# Patient Record
Sex: FEMALE | Race: BLACK OR AFRICAN AMERICAN | NOT HISPANIC OR LATINO | Employment: UNEMPLOYED | URBAN - METROPOLITAN AREA
[De-identification: names, ages, dates, MRNs, and addresses within clinical notes are randomized per-mention and may not be internally consistent; named-entity substitution may affect disease eponyms.]

---

## 2023-07-23 ENCOUNTER — HOSPITAL ENCOUNTER (INPATIENT)
Facility: HOSPITAL | Age: 53
LOS: 1 days | Discharge: HOME | DRG: 417 | End: 2023-07-27
Attending: EMERGENCY MEDICINE | Admitting: EMERGENCY MEDICINE

## 2023-07-23 ENCOUNTER — APPOINTMENT (OUTPATIENT)
Dept: CT IMAGING | Facility: HOSPITAL | Age: 53
DRG: 417 | End: 2023-07-23

## 2023-07-23 DIAGNOSIS — R10.84 GENERALIZED ABDOMINAL PAIN: Primary | ICD-10-CM

## 2023-07-23 DIAGNOSIS — K81.0 ACUTE CHOLECYSTITIS: ICD-10-CM

## 2023-07-23 DIAGNOSIS — R11.2 NAUSEA AND VOMITING, UNSPECIFIED VOMITING TYPE: ICD-10-CM

## 2023-07-23 LAB
ALBUMIN SERPL-MCNC: 4.6 G/DL (ref 3.5–5.2)
ALBUMIN/GLOB SERPL: 1.5 G/DL
ALP SERPL-CCNC: 89 U/L (ref 39–117)
ALT SERPL W P-5'-P-CCNC: 18 U/L (ref 1–33)
AMPHET+METHAMPHET UR QL: NEGATIVE
ANION GAP SERPL CALCULATED.3IONS-SCNC: 14 MMOL/L (ref 5–15)
AST SERPL-CCNC: 20 U/L (ref 1–32)
BACTERIA UR QL AUTO: ABNORMAL /HPF
BARBITURATES UR QL SCN: NEGATIVE
BASOPHILS # BLD AUTO: 0 10*3/MM3 (ref 0–0.2)
BASOPHILS NFR BLD AUTO: 0.1 % (ref 0–1.5)
BENZODIAZ UR QL SCN: NEGATIVE
BILIRUB SERPL-MCNC: 0.2 MG/DL (ref 0–1.2)
BILIRUB UR QL STRIP: NEGATIVE
BUN SERPL-MCNC: 20 MG/DL (ref 6–20)
BUN/CREAT SERPL: 18.2 (ref 7–25)
CALCIUM SPEC-SCNC: 10.1 MG/DL (ref 8.6–10.5)
CANNABINOIDS SERPL QL: NEGATIVE
CHLORIDE SERPL-SCNC: 109 MMOL/L (ref 98–107)
CLARITY UR: CLEAR
CO2 SERPL-SCNC: 19 MMOL/L (ref 22–29)
COCAINE UR QL: POSITIVE
COLOR UR: YELLOW
CREAT SERPL-MCNC: 1.1 MG/DL (ref 0.57–1)
DEPRECATED RDW RBC AUTO: 49 FL (ref 37–54)
EGFRCR SERPLBLD CKD-EPI 2021: 59.8 ML/MIN/1.73
EOSINOPHIL # BLD AUTO: 0 10*3/MM3 (ref 0–0.4)
EOSINOPHIL NFR BLD AUTO: 0.2 % (ref 0.3–6.2)
ERYTHROCYTE [DISTWIDTH] IN BLOOD BY AUTOMATED COUNT: 14.3 % (ref 12.3–15.4)
GLOBULIN UR ELPH-MCNC: 3.1 GM/DL
GLUCOSE SERPL-MCNC: 109 MG/DL (ref 65–99)
GLUCOSE UR STRIP-MCNC: NEGATIVE MG/DL
HCT VFR BLD AUTO: 49.4 % (ref 34–46.6)
HGB BLD-MCNC: 15.9 G/DL (ref 12–15.9)
HGB UR QL STRIP.AUTO: ABNORMAL
HYALINE CASTS UR QL AUTO: ABNORMAL /LPF
KETONES UR QL STRIP: NEGATIVE
LEUKOCYTE ESTERASE UR QL STRIP.AUTO: NEGATIVE
LIPASE SERPL-CCNC: 29 U/L (ref 13–60)
LYMPHOCYTES # BLD AUTO: 1.6 10*3/MM3 (ref 0.7–3.1)
LYMPHOCYTES NFR BLD AUTO: 14.5 % (ref 19.6–45.3)
MAGNESIUM SERPL-MCNC: 2.3 MG/DL (ref 1.6–2.6)
MCH RBC QN AUTO: 31.2 PG (ref 26.6–33)
MCHC RBC AUTO-ENTMCNC: 32.1 G/DL (ref 31.5–35.7)
MCV RBC AUTO: 97.2 FL (ref 79–97)
METHADONE UR QL SCN: NEGATIVE
MONOCYTES # BLD AUTO: 0.4 10*3/MM3 (ref 0.1–0.9)
MONOCYTES NFR BLD AUTO: 3.7 % (ref 5–12)
NEUTROPHILS NFR BLD AUTO: 8.9 10*3/MM3 (ref 1.7–7)
NEUTROPHILS NFR BLD AUTO: 81.5 % (ref 42.7–76)
NITRITE UR QL STRIP: NEGATIVE
NRBC BLD AUTO-RTO: 0.1 /100 WBC (ref 0–0.2)
OPIATES UR QL: NEGATIVE
OXYCODONE UR QL SCN: NEGATIVE
PH UR STRIP.AUTO: <=5 [PH] (ref 5–8)
PLATELET # BLD AUTO: 250 10*3/MM3 (ref 140–450)
PMV BLD AUTO: 7.4 FL (ref 6–12)
POTASSIUM SERPL-SCNC: 4.3 MMOL/L (ref 3.5–5.2)
PROT SERPL-MCNC: 7.7 G/DL (ref 6–8.5)
PROT UR QL STRIP: NEGATIVE
RBC # BLD AUTO: 5.08 10*6/MM3 (ref 3.77–5.28)
RBC # UR STRIP: ABNORMAL /HPF
REF LAB TEST METHOD: ABNORMAL
SODIUM SERPL-SCNC: 142 MMOL/L (ref 136–145)
SP GR UR STRIP: 1.1 (ref 1–1.03)
SQUAMOUS #/AREA URNS HPF: ABNORMAL /HPF
UROBILINOGEN UR QL STRIP: ABNORMAL
WBC # UR STRIP: ABNORMAL /HPF
WBC NRBC COR # BLD: 10.9 10*3/MM3 (ref 3.4–10.8)

## 2023-07-23 PROCEDURE — 80307 DRUG TEST PRSMV CHEM ANLYZR: CPT

## 2023-07-23 PROCEDURE — 83690 ASSAY OF LIPASE: CPT

## 2023-07-23 PROCEDURE — 80053 COMPREHEN METABOLIC PANEL: CPT

## 2023-07-23 PROCEDURE — 85025 COMPLETE CBC W/AUTO DIFF WBC: CPT

## 2023-07-23 PROCEDURE — 25510000001 IOPAMIDOL PER 1 ML: Performed by: EMERGENCY MEDICINE

## 2023-07-23 PROCEDURE — 25010000002 KETOROLAC TROMETHAMINE PER 15 MG

## 2023-07-23 PROCEDURE — 80307 DRUG TEST PRSMV CHEM ANLYZR: CPT | Performed by: NURSE PRACTITIONER

## 2023-07-23 PROCEDURE — 25010000002 ONDANSETRON PER 1 MG

## 2023-07-23 PROCEDURE — 81001 URINALYSIS AUTO W/SCOPE: CPT

## 2023-07-23 PROCEDURE — 74177 CT ABD & PELVIS W/CONTRAST: CPT

## 2023-07-23 PROCEDURE — 83735 ASSAY OF MAGNESIUM: CPT

## 2023-07-23 PROCEDURE — 99285 EMERGENCY DEPT VISIT HI MDM: CPT

## 2023-07-23 RX ORDER — SODIUM CHLORIDE 0.9 % (FLUSH) 0.9 %
10 SYRINGE (ML) INJECTION AS NEEDED
Status: DISCONTINUED | OUTPATIENT
Start: 2023-07-23 | End: 2023-07-27 | Stop reason: HOSPADM

## 2023-07-23 RX ORDER — ONDANSETRON 2 MG/ML
4 INJECTION INTRAMUSCULAR; INTRAVENOUS ONCE
Status: COMPLETED | OUTPATIENT
Start: 2023-07-23 | End: 2023-07-23

## 2023-07-23 RX ORDER — KETOROLAC TROMETHAMINE 15 MG/ML
15 INJECTION, SOLUTION INTRAMUSCULAR; INTRAVENOUS ONCE
Status: COMPLETED | OUTPATIENT
Start: 2023-07-23 | End: 2023-07-23

## 2023-07-23 RX ORDER — SODIUM CHLORIDE 9 MG/ML
125 INJECTION, SOLUTION INTRAVENOUS CONTINUOUS
Status: DISCONTINUED | OUTPATIENT
Start: 2023-07-24 | End: 2023-07-24 | Stop reason: ALTCHOICE

## 2023-07-23 RX ADMIN — ONDANSETRON 4 MG: 2 INJECTION INTRAMUSCULAR; INTRAVENOUS at 20:40

## 2023-07-23 RX ADMIN — KETOROLAC TROMETHAMINE 15 MG: 15 INJECTION, SOLUTION INTRAMUSCULAR; INTRAVENOUS at 20:40

## 2023-07-23 RX ADMIN — SODIUM CHLORIDE 1000 ML: 9 INJECTION, SOLUTION INTRAVENOUS at 20:47

## 2023-07-23 RX ADMIN — IOPAMIDOL 100 ML: 755 INJECTION, SOLUTION INTRAVENOUS at 21:20

## 2023-07-23 NOTE — ED PROVIDER NOTES
"Subjective   History of Present Illness  Patient is a 52-year-old -American female with a history of Crohn's disease and chronic kidney disease who presents the emergency room with generalized abdominal pain, nausea and 3 bouts of vomiting that started yesterday.  Patient states that she has not had fever, shortness of breath, chest pain or dizziness but feels \"extremely thirsty\" and is asking for something to drink.  She denies hematochezia or melena but states that she \"feels like she is dying\" due to the amount of abdominal pain she is having.  Patient does not have a primary care provider or gastroenterologist because she does not have insurance and \"cannot afford it.\"  She does not take any medication on a daily basis and has no known drug allergies.  Patient states that she has a history of crack cocaine use, with her last use about a week ago.  She denies use of alcohol but states that she smokes about a pack of cigarettes over 3 days.    Review of Systems   Constitutional:  Negative for appetite change and fever.   HENT:  Negative for congestion and rhinorrhea.    Respiratory:  Negative for shortness of breath.    Cardiovascular:  Negative for chest pain.   Gastrointestinal:  Positive for abdominal pain, nausea and vomiting. Negative for blood in stool.   Genitourinary:  Negative for dysuria and urgency.   Musculoskeletal:  Negative for arthralgias and myalgias.   Neurological:  Negative for syncope and headaches.   Hematological:  Negative for adenopathy.   Psychiatric/Behavioral:  The patient is nervous/anxious.    All other systems reviewed and are negative.    Past Medical History:   Diagnosis Date    CKD (chronic kidney disease)     Crohn disease        No Known Allergies    No past surgical history on file.    No family history on file.    Social History     Socioeconomic History    Marital status: Legally            Objective   Physical Exam  Vitals and nursing note reviewed. "   Constitutional:       General: She is awake. She is not in acute distress.     Appearance: Normal appearance. She is well-developed. She is not diaphoretic.   HENT:      Head: Normocephalic and atraumatic.      Right Ear: Tympanic membrane, ear canal and external ear normal.      Left Ear: Tympanic membrane, ear canal and external ear normal.   Eyes:      Extraocular Movements: Extraocular movements intact.      Pupils: Pupils are equal, round, and reactive to light.   Cardiovascular:      Rate and Rhythm: Normal rate and regular rhythm.      Pulses: Normal pulses.      Heart sounds: Normal heart sounds. No murmur heard.  Pulmonary:      Effort: Pulmonary effort is normal.      Breath sounds: Normal breath sounds.   Abdominal:      General: Abdomen is flat. Bowel sounds are decreased.      Palpations: Abdomen is soft.      Tenderness: There is generalized abdominal tenderness.   Musculoskeletal:         General: Normal range of motion.      Cervical back: Normal range of motion and neck supple.   Skin:     General: Skin is warm and dry.      Capillary Refill: Capillary refill takes less than 2 seconds.   Neurological:      General: No focal deficit present.      Mental Status: She is alert and oriented to person, place, and time. Mental status is at baseline.      GCS: GCS eye subscore is 4. GCS verbal subscore is 5. GCS motor subscore is 6.      Cranial Nerves: Cranial nerves 2-12 are intact.      Sensory: Sensation is intact.      Motor: Motor function is intact.      Deep Tendon Reflexes: Reflexes are normal and symmetric.   Psychiatric:         Mood and Affect: Mood normal.         Behavior: Behavior normal. Behavior is cooperative.       Procedures           ED Course  ED Course as of 07/24/23 0032   Sun Jul 23, 2023   2152 CT resulted.  Awaiting urinalysis collection. [SJ]   2254 Awaiting urine sample collection.  Primary nurse notified [SJ]      ED Course User Index  [SJ] Shaniqua Staley, MYCHAL      BP  "154/78   Pulse 59   Temp 97 °F (36.1 °C)   Resp 22   Ht 160 cm (63\")   Wt 47.6 kg (105 lb)   SpO2 (!) 89%   BMI 18.60 kg/m²     .edlabs.edmeds  CT Abdomen Pelvis With Contrast    Result Date: 7/23/2023  Impression: 1. Mild gallbladder wall thickening seen near the fundus which appears similar as compared to the previous study. No evidence of stones or biliary ductal dilatation. Otherwise, no acute intra-abdominal or intrapelvic process. 2. Ancillary findings as described above. Electronically Signed: Amber Ruby  7/23/2023 9:29 PM EDT  Workstation ID: [id number]                                        Medical Decision Making  Problems Addressed:  Generalized abdominal pain: complicated acute illness or injury  Nausea and vomiting, unspecified vomiting type: complicated acute illness or injury    Amount and/or Complexity of Data Reviewed  Labs: ordered. Decision-making details documented in ED Course.  Radiology: ordered. Decision-making details documented in ED Course.  ECG/medicine tests: ordered.    Risk  Prescription drug management.    Patient is an -American female with a history of Crohn's disease and chronic kidney disease who presents to the emergency room with generalized abdominal pain, nausea and vomiting for the past 2 days.  Exam is relatively unremarkable with normal S1/S2.  No clicks or murmurs.  No JVD or leg swelling.  Lungs clear on auscultation in all fields.  Abdomen found to be soft but generally tender with hypoactive bowel sounds throughout.  She is very anxious on exam and having difficulty concentrating and answering questions.  Pupils PERRLA without cervical lymphadenopathy.  Initial differentials include Crohn's disease flare, acute cholecystitis, acute pancreatitis, constipation.  This is not a complete list.      IV was established and labs were obtained.  Patient received the above examination.  She received Zofran and Toradol but will be given other medication after urine " "drug screen has been collected.  CBC with very mild leukocytosis of 10.9 without anemia.  CMP with elevated creatinine of 1.1 concerning for dehydration.  Urinalysis negative for acute UTI.  Acute pancreatitis ruled out with normal lipase.  Cocaine detected on urine drug screen but otherwise it is negative.  My interpretation of CT reveals no free air in the abdomen, ureteral stones or cholelithiasis.  This is concurrent with radiologist interpretation, who notes no acute abnormality.  Upon reassessment, patient is more comfortable after pain medication but states that she is still hurting quite a bit.  Patient was given Dilaudid for pain after I completed an inspect, which found the patient had not filled narcotic pain medications in the past 2 years.  At this time, I believe patient is having a Crohn's flare and do not believe that she is exaggerating the pain that she is in.  Patient asked \"do not give up on me\" in regards to figuring out what is causing her pain.  She does not have insurance and has not been able to follow-up to gastroenterology on an outpatient basis but I believe needs to establish some sort of care with them for this chronic condition.  Patient was offered overnight observation and readily excepted.  Patient was placed in ED observation unit for gastroenterologist consult in the morning.  She was made n.p.o. at midnight in case they wanted to complete any scope in the morning.  Patient has remained hemodynamically stable and is in no acute distress.+    Final diagnoses:   Generalized abdominal pain   Nausea and vomiting, unspecified vomiting type       ED Disposition  ED Disposition       ED Disposition   Decision to Admit    Condition   --    Comment   --               No follow-up provider specified.       Medication List      No changes were made to your prescriptions during this visit.            Shaniqua Staley, APRN  07/24/23 0033    "

## 2023-07-24 ENCOUNTER — INPATIENT HOSPITAL (OUTPATIENT)
Dept: URBAN - METROPOLITAN AREA HOSPITAL 84 | Facility: HOSPITAL | Age: 53
End: 2023-07-24

## 2023-07-24 DIAGNOSIS — R10.11 RIGHT UPPER QUADRANT PAIN: ICD-10-CM

## 2023-07-24 DIAGNOSIS — R19.7 DIARRHEA, UNSPECIFIED: ICD-10-CM

## 2023-07-24 DIAGNOSIS — D72.829 ELEVATED WHITE BLOOD CELL COUNT, UNSPECIFIED: ICD-10-CM

## 2023-07-24 DIAGNOSIS — R93.3 ABNORMAL FINDINGS ON DIAGNOSTIC IMAGING OF OTHER PARTS OF DI: ICD-10-CM

## 2023-07-24 DIAGNOSIS — R11.2 NAUSEA WITH VOMITING, UNSPECIFIED: ICD-10-CM

## 2023-07-24 PROBLEM — R10.9 ABDOMINAL PAIN: Status: ACTIVE | Noted: 2023-07-24

## 2023-07-24 LAB
AMYLASE SERPL-CCNC: 300 U/L (ref 28–100)
ANION GAP SERPL CALCULATED.3IONS-SCNC: 10 MMOL/L (ref 5–15)
BASOPHILS # BLD AUTO: 0 10*3/MM3 (ref 0–0.2)
BASOPHILS NFR BLD AUTO: 0.1 % (ref 0–1.5)
BUN SERPL-MCNC: 15 MG/DL (ref 6–20)
BUN/CREAT SERPL: 17.9 (ref 7–25)
CALCIUM SPEC-SCNC: 9 MG/DL (ref 8.6–10.5)
CHLORIDE SERPL-SCNC: 105 MMOL/L (ref 98–107)
CHOLEST SERPL-MCNC: 152 MG/DL (ref 0–200)
CO2 SERPL-SCNC: 22 MMOL/L (ref 22–29)
CREAT SERPL-MCNC: 0.84 MG/DL (ref 0.57–1)
CRP SERPL-MCNC: <0.3 MG/DL (ref 0–0.5)
DEPRECATED RDW RBC AUTO: 47.3 FL (ref 37–54)
EGFRCR SERPLBLD CKD-EPI 2021: 82.7 ML/MIN/1.73
EOSINOPHIL # BLD AUTO: 0 10*3/MM3 (ref 0–0.4)
EOSINOPHIL NFR BLD AUTO: 0.3 % (ref 0.3–6.2)
ERYTHROCYTE [DISTWIDTH] IN BLOOD BY AUTOMATED COUNT: 13.8 % (ref 12.3–15.4)
ERYTHROCYTE [SEDIMENTATION RATE] IN BLOOD: 11 MM/HR (ref 0–30)
GLUCOSE SERPL-MCNC: 84 MG/DL (ref 65–99)
HBA1C MFR BLD: 5.5 % (ref 4.8–5.6)
HCT VFR BLD AUTO: 39.2 % (ref 34–46.6)
HDLC SERPL-MCNC: 73 MG/DL (ref 40–60)
HGB BLD-MCNC: 12.9 G/DL (ref 12–15.9)
LDLC SERPL CALC-MCNC: 67 MG/DL (ref 0–100)
LDLC/HDLC SERPL: 0.92 {RATIO}
LYMPHOCYTES # BLD AUTO: 2.6 10*3/MM3 (ref 0.7–3.1)
LYMPHOCYTES NFR BLD AUTO: 23.2 % (ref 19.6–45.3)
MCH RBC QN AUTO: 30.8 PG (ref 26.6–33)
MCHC RBC AUTO-ENTMCNC: 32.9 G/DL (ref 31.5–35.7)
MCV RBC AUTO: 93.5 FL (ref 79–97)
MONOCYTES # BLD AUTO: 1.1 10*3/MM3 (ref 0.1–0.9)
MONOCYTES NFR BLD AUTO: 9.9 % (ref 5–12)
NEUTROPHILS NFR BLD AUTO: 66.5 % (ref 42.7–76)
NEUTROPHILS NFR BLD AUTO: 7.4 10*3/MM3 (ref 1.7–7)
NRBC BLD AUTO-RTO: 0.1 /100 WBC (ref 0–0.2)
PLATELET # BLD AUTO: 277 10*3/MM3 (ref 140–450)
PMV BLD AUTO: 8.1 FL (ref 6–12)
POTASSIUM SERPL-SCNC: 4.2 MMOL/L (ref 3.5–5.2)
RBC # BLD AUTO: 4.19 10*6/MM3 (ref 3.77–5.28)
SODIUM SERPL-SCNC: 137 MMOL/L (ref 136–145)
TRIGL SERPL-MCNC: 60 MG/DL (ref 0–150)
VLDLC SERPL-MCNC: 12 MG/DL (ref 5–40)
WBC NRBC COR # BLD: 11.2 10*3/MM3 (ref 3.4–10.8)

## 2023-07-24 PROCEDURE — 25010000002 METHYLPREDNISOLONE PER 40 MG: Performed by: NURSE PRACTITIONER

## 2023-07-24 PROCEDURE — 25010000002 HYDROMORPHONE 1 MG/ML SOLUTION: Performed by: SURGERY

## 2023-07-24 PROCEDURE — 80048 BASIC METABOLIC PNL TOTAL CA: CPT

## 2023-07-24 PROCEDURE — G0378 HOSPITAL OBSERVATION PER HR: HCPCS

## 2023-07-24 PROCEDURE — 85652 RBC SED RATE AUTOMATED: CPT | Performed by: NURSE PRACTITIONER

## 2023-07-24 PROCEDURE — 85027 COMPLETE CBC AUTOMATED: CPT

## 2023-07-24 PROCEDURE — 86140 C-REACTIVE PROTEIN: CPT | Performed by: NURSE PRACTITIONER

## 2023-07-24 PROCEDURE — 83993 ASSAY FOR CALPROTECTIN FECAL: CPT | Performed by: NURSE PRACTITIONER

## 2023-07-24 PROCEDURE — 99222 1ST HOSP IP/OBS MODERATE 55: CPT | Performed by: NURSE PRACTITIONER

## 2023-07-24 PROCEDURE — 80061 LIPID PANEL: CPT | Performed by: NURSE PRACTITIONER

## 2023-07-24 PROCEDURE — 83036 HEMOGLOBIN GLYCOSYLATED A1C: CPT | Performed by: NURSE PRACTITIONER

## 2023-07-24 PROCEDURE — 82150 ASSAY OF AMYLASE: CPT | Performed by: NURSE PRACTITIONER

## 2023-07-24 PROCEDURE — 25010000002 HYDROMORPHONE 1 MG/ML SOLUTION

## 2023-07-24 PROCEDURE — 25010000002 KETOROLAC TROMETHAMINE PER 15 MG: Performed by: NURSE PRACTITIONER

## 2023-07-24 RX ORDER — SODIUM CHLORIDE 9 MG/ML
40 INJECTION, SOLUTION INTRAVENOUS AS NEEDED
Status: DISCONTINUED | OUTPATIENT
Start: 2023-07-24 | End: 2023-07-27 | Stop reason: HOSPADM

## 2023-07-24 RX ORDER — ONDANSETRON 4 MG/1
4 TABLET, FILM COATED ORAL EVERY 6 HOURS PRN
Status: DISCONTINUED | OUTPATIENT
Start: 2023-07-24 | End: 2023-07-27 | Stop reason: HOSPADM

## 2023-07-24 RX ORDER — AMOXICILLIN 250 MG
2 CAPSULE ORAL 2 TIMES DAILY
Status: DISCONTINUED | OUTPATIENT
Start: 2023-07-24 | End: 2023-07-27 | Stop reason: HOSPADM

## 2023-07-24 RX ORDER — SODIUM CHLORIDE 0.9 % (FLUSH) 0.9 %
10 SYRINGE (ML) INJECTION AS NEEDED
Status: DISCONTINUED | OUTPATIENT
Start: 2023-07-24 | End: 2023-07-27 | Stop reason: HOSPADM

## 2023-07-24 RX ORDER — KETOROLAC TROMETHAMINE 15 MG/ML
15 INJECTION, SOLUTION INTRAMUSCULAR; INTRAVENOUS EVERY 6 HOURS PRN
Status: DISPENSED | OUTPATIENT
Start: 2023-07-24 | End: 2023-07-25

## 2023-07-24 RX ORDER — HYDRALAZINE HYDROCHLORIDE 20 MG/ML
10 INJECTION INTRAMUSCULAR; INTRAVENOUS EVERY 6 HOURS PRN
Status: DISCONTINUED | OUTPATIENT
Start: 2023-07-24 | End: 2023-07-27 | Stop reason: HOSPADM

## 2023-07-24 RX ORDER — ACETAMINOPHEN 325 MG/1
650 TABLET ORAL EVERY 4 HOURS PRN
Status: DISCONTINUED | OUTPATIENT
Start: 2023-07-24 | End: 2023-07-27 | Stop reason: HOSPADM

## 2023-07-24 RX ORDER — BISACODYL 5 MG/1
5 TABLET, DELAYED RELEASE ORAL DAILY PRN
Status: DISCONTINUED | OUTPATIENT
Start: 2023-07-24 | End: 2023-07-27 | Stop reason: HOSPADM

## 2023-07-24 RX ORDER — BISACODYL 10 MG
10 SUPPOSITORY, RECTAL RECTAL DAILY PRN
Status: DISCONTINUED | OUTPATIENT
Start: 2023-07-24 | End: 2023-07-27 | Stop reason: HOSPADM

## 2023-07-24 RX ORDER — ONDANSETRON 2 MG/ML
4 INJECTION INTRAMUSCULAR; INTRAVENOUS EVERY 6 HOURS PRN
Status: DISCONTINUED | OUTPATIENT
Start: 2023-07-24 | End: 2023-07-24 | Stop reason: SDUPTHER

## 2023-07-24 RX ORDER — SODIUM CHLORIDE 0.9 % (FLUSH) 0.9 %
10 SYRINGE (ML) INJECTION EVERY 12 HOURS SCHEDULED
Status: DISCONTINUED | OUTPATIENT
Start: 2023-07-24 | End: 2023-07-27 | Stop reason: HOSPADM

## 2023-07-24 RX ORDER — SODIUM CHLORIDE 9 MG/ML
100 INJECTION, SOLUTION INTRAVENOUS CONTINUOUS
Status: DISCONTINUED | OUTPATIENT
Start: 2023-07-24 | End: 2023-07-26

## 2023-07-24 RX ORDER — METHYLPREDNISOLONE SODIUM SUCCINATE 40 MG/ML
20 INJECTION, POWDER, LYOPHILIZED, FOR SOLUTION INTRAMUSCULAR; INTRAVENOUS EVERY 8 HOURS
Status: DISCONTINUED | OUTPATIENT
Start: 2023-07-24 | End: 2023-07-25

## 2023-07-24 RX ORDER — POLYETHYLENE GLYCOL 3350 17 G/17G
17 POWDER, FOR SOLUTION ORAL DAILY PRN
Status: DISCONTINUED | OUTPATIENT
Start: 2023-07-24 | End: 2023-07-27 | Stop reason: HOSPADM

## 2023-07-24 RX ORDER — SODIUM CHLORIDE 9 MG/ML
40 INJECTION, SOLUTION INTRAVENOUS AS NEEDED
Status: DISCONTINUED | OUTPATIENT
Start: 2023-07-24 | End: 2023-07-24 | Stop reason: SDUPTHER

## 2023-07-24 RX ORDER — ONDANSETRON 2 MG/ML
4 INJECTION INTRAMUSCULAR; INTRAVENOUS EVERY 6 HOURS PRN
Status: DISCONTINUED | OUTPATIENT
Start: 2023-07-24 | End: 2023-07-27 | Stop reason: HOSPADM

## 2023-07-24 RX ADMIN — METHYLPREDNISOLONE SODIUM SUCCINATE 20 MG: 40 INJECTION, POWDER, FOR SOLUTION INTRAMUSCULAR; INTRAVENOUS at 14:15

## 2023-07-24 RX ADMIN — Medication 10 ML: at 09:06

## 2023-07-24 RX ADMIN — HYDROMORPHONE HYDROCHLORIDE 0.5 MG: 1 INJECTION, SOLUTION INTRAMUSCULAR; INTRAVENOUS; SUBCUTANEOUS at 02:51

## 2023-07-24 RX ADMIN — KETOROLAC TROMETHAMINE 15 MG: 15 INJECTION, SOLUTION INTRAMUSCULAR; INTRAVENOUS at 09:05

## 2023-07-24 RX ADMIN — HYDROMORPHONE HYDROCHLORIDE 0.5 MG: 1 INJECTION, SOLUTION INTRAMUSCULAR; INTRAVENOUS; SUBCUTANEOUS at 14:15

## 2023-07-24 RX ADMIN — SODIUM CHLORIDE 100 ML/HR: 9 INJECTION, SOLUTION INTRAVENOUS at 02:40

## 2023-07-24 RX ADMIN — SODIUM CHLORIDE 100 ML/HR: 9 INJECTION, SOLUTION INTRAVENOUS at 22:46

## 2023-07-24 RX ADMIN — KETOROLAC TROMETHAMINE 15 MG: 15 INJECTION, SOLUTION INTRAMUSCULAR; INTRAVENOUS at 19:32

## 2023-07-24 RX ADMIN — HYDROMORPHONE HYDROCHLORIDE 0.5 MG: 1 INJECTION, SOLUTION INTRAMUSCULAR; INTRAVENOUS; SUBCUTANEOUS at 22:47

## 2023-07-24 RX ADMIN — Medication 10 ML: at 02:52

## 2023-07-24 RX ADMIN — METHYLPREDNISOLONE SODIUM SUCCINATE 20 MG: 40 INJECTION, POWDER, FOR SOLUTION INTRAMUSCULAR; INTRAVENOUS at 22:47

## 2023-07-24 RX ADMIN — SENNOSIDES AND DOCUSATE SODIUM 2 TABLET: 50; 8.6 TABLET ORAL at 22:47

## 2023-07-24 RX ADMIN — HYDROMORPHONE HYDROCHLORIDE 0.5 MG: 1 INJECTION, SOLUTION INTRAMUSCULAR; INTRAVENOUS; SUBCUTANEOUS at 19:32

## 2023-07-24 NOTE — DISCHARGE INSTR - APPOINTMENTS
September 11th at 1030 AM- New PCP appointment   [place] [id number]  [phone]  **Take photo ID and insurance card.  **Please arrive 15 minutes early for paperwork.

## 2023-07-24 NOTE — SIGNIFICANT NOTE
07/24/23 1034   Living Situation   Current Living Arrangements other (see comments)  (Living with a friend)   Potentially Unsafe Housing Conditions none   Food Insecurity   Within the past 12 months, you worried that your food would run out before you got the money to buy more. Sometimes   Within the past 12 months, the food you bought just didn't last and you didn't have money to get more. Sometimes   Transportation Needs   In the past 12 months, has lack of transportation kept you from medical appointments or from getting medications? no   In the past 12 months, has lack of transportation kept you from meetings, work, or from getting things needed for daily living? No   Utilities   In the past 12 months has the electric, gas, oil, or water company threatened to shut off services in your home? No   Abuse Screen (yes response referral indicated)   Feels Unsafe at Home or Work/School no   Feels Threatened by Someone no   Does Anyone Try to Keep You From Having Contact with Others or Doing Things Outside Your Home? no   Physical Signs of Abuse Present no   Employment   Do you want help finding or keeping work or a job? I do not need or want help   Family and Community Support   If for any reason you need help with day-to-day activities such as bathing, preparing meals, shopping, managing finances, etc., do you get the help you need? I don't need any help   How often do you feel lonely or isolated from those around you? Never   Education   Preferred Language English   Do you want help with school or training? For example, starting or completing job training or getting a high school diploma, GED or equivalent No   Physical Activity   On average, how many days per week do you engage in moderate to strenuous exercise (like a brisk walk)? 0 days   On average, how many minutes do you engage in exercise at this level? 0 min   Number of minutes of exercise per week (!) 0   Alcohol Use   Q1: How often do you have a drink  containing alcohol? Never   Q2: How many drinks containing alcohol do you have on a typical day when you are drinking? None   Q3: How often do you have six or more drinks on one occasion? Never   Mental Health   Little Interest or Pleasure in Doing Things 0-->not at all   Feeling Down, Depressed or Hopeless 0-->not at all   Disabilities   Concentrating, Remembering or Making Decisions Difficulty no   Doing Errands Independently Difficulty (such as shopping) no

## 2023-07-24 NOTE — CASE MANAGEMENT/SOCIAL WORK
Social Work Assessment  [facility]     Patient Name: Marta Aguilar  MRN: [id number]  Today's Date: 7/24/2023    Admit Date: 7/23/2023     Discharge Needs Assessment       Row Name 07/24/23 1021       Living Environment    People in Home other (see comments)  Pt is staying with a friend currently    Current Living Arrangements home    Potentially Unsafe Housing Conditions none    Primary Care Provided by self    Provides Primary Care For no one    Family Caregiver if Needed none    Able to Return to Prior Arrangements yes       Resource/Environmental Concerns    Resource/Environmental Concerns financial    Financial Concerns food, unable to afford    Transportation Concerns none       Food Insecurity    Within the past 12 months, you worried that your food would run out before you got the money to buy more. Sometimes    Within the past 12 months, the food you bought just didn't last and you didn't have money to get more. Sometimes       Transition Planning    Patient/Family Anticipates Transition to other (see comments)  home with a friend    Patient/Family Anticipated Services at Transition     Transportation Anticipated family or friend will provide       Discharge Needs Assessment    Readmission Within the Last 30 Days no previous admission in last 30 days    Equipment Currently Used at Home none    Concerns to be Addressed basic needs;financial/insurance                   Discharge Plan       Row Name 07/24/23 1025       Plan    Plan DC plan: From a friend's house. WIll return to friend's house. Friend will trnsport.    Plan Comments HARVEY met with pt at bedside to follow up on no insurance and CM assessment. Pt reported she has [place] medicaid but doesnt have her card here.  Pt would like a PCP appointment scheduled.  HARVEY scheduled PCP with [facility] in [place].  Appointment added in [facility]. Pt confirmed PCP on file. Pt reported she is unable to work due to Crohn's and has no source of income  "currently but is working on gettin disability.  Pt reported due to a 'domestic dispute\" she is staying with a friend. Pt would like a referral put in for help getting housing setup, help [place], and food stamps. SW placed referrals for all of the above.  Pt would also like a phone number to medical records. SW provided phone number for ot. Pt reported she is independent with ADLs. Pt plans to return back to her friend's house and her friend or sister will pick her up at [place]. No other needs identified.                  Continued Care and Services - Admitted Since 7/23/2023    Coordination has not been started for this encounter.       Expected Discharge Date and Time       Expected Discharge Date Expected Discharge Time    Jul 24, 2023            Demographic Summary       Row Name 07/24/23 1021       General Information    Admission Type observation    Arrived From emergency department    Referral Source admission list    Reason for Consult discharge planning    Preferred Language English       Contact Information    Permission Granted to Share Info With                    Functional Status       Row Name 07/24/23 1021       Functional Status    Usual Activity Tolerance moderate    Current Activity Tolerance moderate       Physical Activity    On average, how many days per week do you engage in moderate to strenuous exercise (like a brisk walk)? 0 days    On average, how many minutes do you engage in exercise at this level? 0 min    Number of minutes of exercise per week 0       Functional Status, IADL    Medications independent    Meal Preparation independent    Housekeeping independent    Laundry independent    Shopping independent       Mental Status    General Appearance WDL WDL       Mental Status Summary    Recent Changes in Mental Status/Cognitive Functioning no changes       Employment/    Employment Status unemployed           Ellie Dailey LCSW  Social " Worker  Office: [phone]  Fax: [phone]  [email or web address]

## 2023-07-24 NOTE — PLAN OF CARE
Problem: Adult Inpatient Plan of Care  Goal: Plan of Care Review  Outcome: Ongoing, Progressing  Flowsheets (Taken 7/24/2023 1804)  Progress: improving  Plan of Care Reviewed With: patient  Outcome Evaluation: Patient had consult with GI today, patient on clear liquid diet, care continues  Goal: Patient-Specific Goal (Individualized)  Outcome: Ongoing, Progressing  Goal: Absence of Hospital-Acquired Illness or Injury  Outcome: Ongoing, Progressing  Intervention: Identify and Manage Fall Risk  Recent Flowsheet Documentation  Taken 7/24/2023 1800 by Soco Iverson RN  Safety Promotion/Fall Prevention: safety round/check completed  Taken 7/24/2023 1600 by Soco Iverson RN  Safety Promotion/Fall Prevention: safety round/check completed  Taken 7/24/2023 1400 by Soco Iverson RN  Safety Promotion/Fall Prevention: safety round/check completed  Taken 7/24/2023 1200 by Soco Iverson RN  Safety Promotion/Fall Prevention: safety round/check completed  Taken 7/24/2023 1000 by Soco Iverson RN  Safety Promotion/Fall Prevention: safety round/check completed  Taken 7/24/2023 0800 by Soco Iverson RN  Safety Promotion/Fall Prevention: safety round/check completed  Intervention: Prevent Skin Injury  Recent Flowsheet Documentation  Taken 7/24/2023 0800 by Soco Iverson RN  Body Position: position changed independently  Skin Protection: adhesive use limited  Intervention: Prevent and Manage VTE (Venous Thromboembolism) Risk  Recent Flowsheet Documentation  Taken 7/24/2023 0800 by Soco Iverson RN  Range of Motion: active ROM (range of motion) encouraged  Intervention: Prevent Infection  Recent Flowsheet Documentation  Taken 7/24/2023 0800 by Soco Iverson RN  Infection Prevention: single patient room provided  Goal: Optimal Comfort and Wellbeing  Outcome: Ongoing, Progressing  Intervention: Monitor Pain and Promote Comfort  Recent Flowsheet Documentation  Taken  7/24/2023 0905 by Soco Iverson RN  Pain Management Interventions:   see MAR   quiet environment facilitated  Taken 7/24/2023 0800 by Sooc Iverson RN  Pain Management Interventions: quiet environment facilitated  Goal: Readiness for Transition of Care  Outcome: Ongoing, Progressing     Problem: Asthma Comorbidity  Goal: Maintenance of Asthma Control  Outcome: Ongoing, Progressing  Intervention: Maintain Asthma Symptom Control  Recent Flowsheet Documentation  Taken 7/24/2023 0800 by Soco Iverson RN  Medication Review/Management: medications reviewed     Problem: Behavioral Health Comorbidity  Goal: Maintenance of Behavioral Health Symptom Control  Outcome: Ongoing, Progressing  Intervention: Maintain Behavioral Health Symptom Control  Recent Flowsheet Documentation  Taken 7/24/2023 0800 by Soco Iverson RN  Medication Review/Management: medications reviewed     Problem: COPD (Chronic Obstructive Pulmonary Disease) Comorbidity  Goal: Maintenance of COPD Symptom Control  Outcome: Ongoing, Progressing  Intervention: Maintain COPD-Symptom Control  Recent Flowsheet Documentation  Taken 7/24/2023 0800 by Soco Iverson RN  Supportive Measures:   verbalization of feelings encouraged   active listening utilized  Medication Review/Management: medications reviewed     Problem: Diabetes Comorbidity  Goal: Blood Glucose Level Within Targeted Range  Outcome: Ongoing, Progressing     Problem: Heart Failure Comorbidity  Goal: Maintenance of Heart Failure Symptom Control  Outcome: Ongoing, Progressing  Intervention: Maintain Heart Failure-Management  Recent Flowsheet Documentation  Taken 7/24/2023 0800 by Soco Iverson RN  Medication Review/Management: medications reviewed     Problem: Hypertension Comorbidity  Goal: Blood Pressure in Desired Range  Outcome: Ongoing, Progressing  Intervention: Maintain Blood Pressure Management  Recent Flowsheet Documentation  Taken 7/24/2023 0800 by  Soco Iverson RN  Medication Review/Management: medications reviewed     Problem: Obstructive Sleep Apnea Risk or Actual Comorbidity Management  Goal: Unobstructed Breathing During Sleep  Outcome: Ongoing, Progressing     Problem: Osteoarthritis Comorbidity  Goal: Maintenance of Osteoarthritis Symptom Control  Outcome: Ongoing, Progressing  Intervention: Maintain Osteoarthritis Symptom Control  Recent Flowsheet Documentation  Taken 7/24/2023 0800 by Soco Iverson RN  Medication Review/Management: medications reviewed     Problem: Pain Chronic (Persistent) (Comorbidity Management)  Goal: Acceptable Pain Control and Functional Ability  Outcome: Ongoing, Progressing  Intervention: Manage Persistent Pain  Recent Flowsheet Documentation  Taken 7/24/2023 0800 by Soco Iverson RN  Medication Review/Management: medications reviewed  Intervention: Develop Pain Management Plan  Recent Flowsheet Documentation  Taken 7/24/2023 0905 by Soco Iverson RN  Pain Management Interventions:   see MAR   quiet environment facilitated  Taken 7/24/2023 0800 by Soco Iverson RN  Pain Management Interventions: quiet environment facilitated  Intervention: Optimize Psychosocial Wellbeing  Recent Flowsheet Documentation  Taken 7/24/2023 0800 by Soco Iverson RN  Supportive Measures:   verbalization of feelings encouraged   active listening utilized     Problem: Seizure Disorder Comorbidity  Goal: Maintenance of Seizure Control  Outcome: Ongoing, Progressing   Goal Outcome Evaluation:  Plan of Care Reviewed With: patient        Progress: improving  Outcome Evaluation: Patient had consult with GI today, patient on clear liquid diet, care continues

## 2023-07-24 NOTE — PLAN OF CARE
Goal Outcome Evaluation:  Plan of Care Reviewed With: patient        Progress: no change  Outcome Evaluation: Patient is a new admission with complaints of abd pain associated with chrons's disease. Patient received a one time dose of 0.5 dilaudid per doctors orders. NS running at 100ml/hr and she is expected to have a GI consult this am. VVS, chest rise and fall are noted. Care continues.

## 2023-07-24 NOTE — CONSULTS
GI CONSULT  NOTE:    Referring Provider:  Shaniqua Staley NP    Chief complaint: Abdominal pain, nausea/vomiting    Subjective .     History of present illness: Marta Aguilar is a 52 y.o. female with history of Crohn's disease and CKD who presents with complaints of abdominal pain and nausea/vomiting.  The patient was diagnosed with Crohn's disease in 1/2013 via colonoscopy.  She has not been on maintenance medications due to not having insurance.  She denies being on any Biologics previously.  States that she began having upper abdominal pain 2 days ago.  The pain is constant and throbbing in nature.  It does radiate to her back.  She describes associated nausea/vomiting, but this has resolved over the past 24 hours.  No heartburn or dysphagia.  States that she had been having diarrhea yesterday, but no bowel movement so far today.  Denies bright red blood per rectum or melena.  States that normally she will move her bowels daily.  No recent fever or unintentional weight loss.      Endo History:  1/2013 EGD/colonoscopy (Dr. Diaz) -normal EGD, Crohn's colitis sparing the rectum    Past Medical History:  Past Medical History:   Diagnosis Date    CKD (chronic kidney disease)     Crohn disease        Past Surgical History:  History reviewed. No pertinent surgical history.    Social History:  Social History     Tobacco Use    Smoking status: Every Day     Packs/day: 0.25     Years: 15.00     Pack years: 3.75     Types: Cigarettes    Smokeless tobacco: Never   Vaping Use    Vaping Use: Never used   Substance Use Topics    Alcohol use: Never    Drug use: Yes     Types: Cocaine(coke)       Family History:  History reviewed. No pertinent family history.    Medications:  No medications prior to admission.       Scheduled Meds:senna-docusate sodium, 2 tablet, Oral, BID  sodium chloride, 10 mL, Intravenous, Q12H  sodium chloride, 10 mL, Intravenous, Q12H      Continuous Infusions:sodium chloride, 100 mL/hr, Last  "Rate: 100 mL/hr (07/24/23 0906)      PRN Meds:.  acetaminophen    senna-docusate sodium **AND** polyethylene glycol **AND** bisacodyl **AND** bisacodyl    hydrALAZINE    HYDROmorphone    ketorolac    ondansetron **OR** ondansetron    [COMPLETED] Insert Peripheral IV **AND** sodium chloride    sodium chloride    sodium chloride    sodium chloride    ALLERGIES:  Patient has no known allergies.    ROS:  The following systems were reviewed and negative;   Constitution:  No fevers, chills, no unintentional weight loss  Skin: no rash, no jaundice  Eyes:  No blurry vision, no eye pain  HENT:  No change in hearing or smell  Resp:  No dyspnea or cough  CV:  No chest pain or palpitations  :  No dysuria, hematuria  Musculoskeletal:  No leg cramps or arthralgias  Neuro:  No tremor, no numbness  Psych:  No depression or confusion    Objective     Vital Signs:   Vitals:    07/23/23 2331 07/24/23 0149 07/24/23 0646 07/24/23 1100   BP: 154/78 165/97 113/70 117/59   BP Location:  Left arm Left arm Left arm   Patient Position:  Lying Lying Lying   Pulse: 59 60 (!) 45 54   Resp:  22 16 13   Temp:  97.6 °F (36.4 °C) 97.6 °F (36.4 °C)    TempSrc:  Oral Oral    SpO2: (!) 89% 99% 96% 97%   Weight:  47.6 kg (104 lb 15 oz)     Height:  165.1 cm (65\")         Physical Exam:       General Appearance:    Awake and alert, in no acute distress   Head:    Normocephalic, without obvious abnormality, atraumatic   Throat:   No oral lesions, no thrush, oral mucosa moist   Lungs:     Respirations regular, even and unlabored   Chest Wall:    No abnormalities observed   Abdomen:     Soft, upper abdominal tenderness, no rebound or guarding, non-distended   Rectal:     Deferred   Extremities:   Moves all extremities, no edema, no cyanosis   Pulses:   Pulses palpable and equal bilaterally   Skin:   No rash, no jaundice, normal palpation   Lymph nodes:   No cervical, supraclavicular or submandibular palpable adenopathy   Neurologic:   Cranial nerves 2 - " 12 grossly intact, no asterixis       Results Review:   I reviewed the patient's labs and imaging.  CBC    Results from last 7 days   Lab Units 07/24/23  0509 07/23/23 2016   WBC 10*3/mm3 11.20* 10.90*   HEMOGLOBIN g/dL 12.9 15.9   PLATELETS 10*3/mm3 277 250     CMP   Results from last 7 days   Lab Units 07/24/23  0509 07/23/23 2016   SODIUM mmol/L 137 142   POTASSIUM mmol/L 4.2 4.3   CHLORIDE mmol/L 105 109*   CO2 mmol/L 22.0 19.0*   BUN mg/dL 15 20   CREATININE mg/dL 0.84 1.10*   GLUCOSE mg/dL 84 109*   ALBUMIN g/dL  --  4.6   BILIRUBIN mg/dL  --  0.2   ALK PHOS U/L  --  89   AST (SGOT) U/L  --  20   ALT (SGPT) U/L  --  18   MAGNESIUM mg/dL  --  2.3   AMYLASE U/L 300*  --    LIPASE U/L  --  29     Cr Clearance Estimated Creatinine Clearance: 57.5 mL/min (by C-G formula based on SCr of 0.84 mg/dL).  Coag     HbA1C   Lab Results   Component Value Date    HGBA1C 5.50 07/24/2023     Blood Glucose No results found for: POCGLU  Infection     UA    Results from last 7 days   Lab Units 07/23/23  2319   NITRITE UA  Negative   WBC UA /HPF 0-2*   BACTERIA UA /HPF None Seen   SQUAM EPITHEL UA /HPF 0-2     Radiology(recent) CT Abdomen Pelvis With Contrast    Result Date: 7/23/2023  Impression: 1. Mild gallbladder wall thickening seen near the fundus which appears similar as compared to the previous study. No evidence of stones or biliary ductal dilatation. Otherwise, no acute intra-abdominal or intrapelvic process. 2. Ancillary findings as described above. Electronically Signed: Amber Ruby  7/23/2023 9:29 PM EDT  Workstation ID: [id number]        ASSESSMENT:  -Upper abdominal pain  -Nausea/vomiting  -Diarrhea  -Abnormal CT showing mild gallbladder wall thickening  -Leukocytosis  -CKD    PLAN:  Patient is a 52-year-old female with history of Crohn's colitis diagnosed in 2013 who presented on 7/23 with complaints of abdominal pain and nausea/vomiting.  She has not been on any maintenance medications for Crohn's disease due to  not having insurance.    CT abdomen/pelvis W on admission shows mild gallbladder wall thickening near the fundus which appears similar to previous study.  No evidence of gallstones or duct dilation.  No other acute intra-abdominal abnormality.  LFTs normal.  Mild leukocytosis with WBC count of 11.2.  Hemoglobin normal at 12.9.  We will check ESR, CRP, and fecal calprotectin.  Start low-dose steroids and monitor for symptom improvement.  For no improvement, could consider HIDA scan to rule out biliary etiology.  Clear liquid diet.  Patient would likely benefit from repeat colonoscopy as inpatient versus outpatient to determine severity of Crohn's disease.  Antiemetics/analgesics as needed.  Supportive care.      I discussed the patients findings and my recommendations with the patient.  I will discuss case with Dr. Alexis and change plan accordingly.    We appreciate the referral.    Electronically signed by MYCHAL Benedict, 07/24/23, 12:38 PM EDT.

## 2023-07-24 NOTE — H&P
"[facility] H&P    Patient Name: Marta Aguilar  : 1970  MRN: [id number]  Primary Care Physician: Provider, No Known  Date of admission: 2023     Patient Care Team:  Provider, No Known as PCP - General          Subjective   History Present Illness     Chief Complaint:   Chief Complaint   Patient presents with    Abdominal Pain     Abdominal pain    Ms. Aguilar is a 52 y.o.  presents to [facility] complaining of abdominal pain      History of Present Illness    ED 23: Patient is a 52-year-old -American female with a history of Crohn's disease and chronic kidney disease who presents the emergency room with generalized abdominal pain, nausea and 3 bouts of vomiting that started yesterday. Patient states that she has not had fever, shortness of breath, chest pain or dizziness but feels \"extremely thirsty\" and is asking for something to drink. She denies hematochezia or melena but states that she \"feels like she is dying\" due to the amount of abdominal pain she is having. Patient does not have a primary care provider or gastroenterologist because she does not have insurance and \"cannot afford it.\" She does not take any medication on a daily basis and has no known drug allergies. Patient states that she has a history of crack cocaine use, with her last use about a week ago. She denies use of alcohol but states that she smokes about a pack of cigarettes over 3 days.     Observation 23: Patient is a 52-year-old female senting to the ED with abdominal pain, nausea and vomiting.  Patient states she was diagnosed with Crohn's disease in  has not been on medication due to insurance.  Patient reports vomiting yesterday without hemoptysis or melena.  Patient denies fever, chest pain, dyspnea or syncope.  Patient states pain is 8 out of 10 and radiates from her abdominal muscle wall into her back.    Review of Systems   Constitutional: Positive for decreased appetite and " malaise/fatigue.   HENT: Negative.     Eyes: Negative.    Cardiovascular: Negative.    Respiratory: Negative.     Endocrine: Negative.    Hematologic/Lymphatic: Negative.    Skin: Negative.    Musculoskeletal:  Positive for back pain.   Gastrointestinal:  Positive for abdominal pain and nausea.   Genitourinary: Negative.    Neurological: Negative.    Psychiatric/Behavioral: Negative.     Allergic/Immunologic: Negative.          Personal History     Past Medical History:   Past Medical History:   Diagnosis Date    CKD (chronic kidney disease)     Crohn disease        Surgical History:    History reviewed. No pertinent surgical history.        Family History: family history is not on file. Otherwise pertinent FHx was reviewed and unremarkable.     Social History:  reports that she has been smoking cigarettes. She has a 3.75 pack-year smoking history. She has never used smokeless tobacco. She reports current drug use. Drug: Cocaine(coke). She reports that she does not drink alcohol.      Medications:  Prior to Admission medications    Not on File       Allergies:  No Known Allergies    Objective   Objective     Vital Signs  Temp:  [97 °F (36.1 °C)-97.6 °F (36.4 °C)] 97.6 °F (36.4 °C)  Heart Rate:  [39-67] 54  Resp:  [13-22] 13  BP: (113-175)/(59-97) 117/59  SpO2:  [89 %-100 %] 97 %  on  Flow (L/min):  [2] 2;   Device (Oxygen Therapy): room air  Body mass index is 17.46 kg/m².    Physical Exam  Vitals and nursing note reviewed.   Constitutional:       Appearance: Normal appearance.   HENT:      Head: Normocephalic and atraumatic.      Right Ear: External ear normal.      Left Ear: External ear normal.      Nose: Nose normal.      Mouth/Throat:      Mouth: Mucous membranes are moist.   Eyes:      Extraocular Movements: Extraocular movements intact.      Conjunctiva/sclera: Conjunctivae normal.      Pupils: Pupils are equal, round, and reactive to light.   Cardiovascular:      Rate and Rhythm: Normal rate and regular  rhythm.      Pulses: Normal pulses.      Heart sounds: Normal heart sounds.   Pulmonary:      Effort: Pulmonary effort is normal.      Breath sounds: Normal breath sounds.   Abdominal:      Palpations: Abdomen is soft.      Tenderness: There is abdominal tenderness.   Musculoskeletal:         General: Tenderness present.      Cervical back: Normal range of motion.   Skin:     General: Skin is warm.   Neurological:      Mental Status: She is alert and oriented to person, place, and time.   Psychiatric:         Mood and Affect: Mood normal.         Behavior: Behavior normal.         Thought Content: Thought content normal.         Judgment: Judgment normal.         Results Review:  I have personally reviewed most recent cardiac tracings, lab results, microbiology results, and radiology images and interpretations and agree with findings, most notably: CBC, CMP, CT abdomen pelvis, lipase, amylase.    Results from last 7 days   Lab Units 07/24/23  0509   WBC 10*3/mm3 11.20*   HEMOGLOBIN g/dL 12.9   HEMATOCRIT % 39.2   PLATELETS 10*3/mm3 277     Results from last 7 days   Lab Units 07/24/23  0509 07/23/23 2016   SODIUM mmol/L 137 142   POTASSIUM mmol/L 4.2 4.3   CHLORIDE mmol/L 105 109*   CO2 mmol/L 22.0 19.0*   BUN mg/dL 15 20   CREATININE mg/dL 0.84 1.10*   GLUCOSE mg/dL 84 109*   CALCIUM mg/dL 9.0 10.1   ALT (SGPT) U/L  --  18   AST (SGOT) U/L  --  20     Estimated Creatinine Clearance: 57.5 mL/min (by C-G formula based on SCr of 0.84 mg/dL).  Brief Urine Lab Results  (Last result in the past 365 days)        Color   Clarity   Blood   Leuk Est   Nitrite   Protein   CREAT   Urine HCG        07/23/23 2319 Yellow   Clear   Trace   Negative   Negative   Negative                   Microbiology Results (last 10 days)       ** No results found for the last 240 hours. **            ECG/EMG Results (most recent)       Procedure Component Value Units Date/Time    SCANNED - TELEMETRY   [[id number]] Resulted: 07/23/23     Updated:  07/24/23 0934    SCANNED - TELEMETRY   [[id number]] Resulted: 07/23/23     Updated: 07/24/23 0946                    CT Abdomen Pelvis With Contrast    Result Date: 7/23/2023  Impression: 1. Mild gallbladder wall thickening seen near the fundus which appears similar as compared to the previous study. No evidence of stones or biliary ductal dilatation. Otherwise, no acute intra-abdominal or intrapelvic process. 2. Ancillary findings as described above. Electronically Signed: Amber Tung  7/23/2023 9:29 PM EDT  Workstation ID: [id number]       Estimated Creatinine Clearance: 57.5 mL/min (by C-G formula based on SCr of 0.84 mg/dL).    Assessment & Plan   Assessment/Plan       Active Hospital Problems    Diagnosis  POA    **Abdominal pain [R10.9]  Yes      Resolved Hospital Problems   No resolved problems to display.     Abdominal pain  Lab Results   Component Value Date    WBC 11.20 (H) 07/24/2023    AST 20 07/23/2023    ALT 18 07/23/2023    ALKPHOS 89 07/23/2023    BILITOT 0.2 07/23/2023    LIPASE 29 07/23/2023     -CT of abdomen and pelvis: Mild gallbladder wall thickening which appears similar to previous study with no evidence of stones or biliary ductal dilation  -Amylase 300, lipase 29  -Clear liquid diet  -Continue IV fluids  -IV/oral antiemetics and analgesics as needed  -Steroids initially  -GI consult      Substance abuse  -UDS positive for cocaine  -        VTE Prophylaxis -   Mechanical Order History:        Ordered        07/24/23 0639  Place Sequential Compression Device  Once            07/24/23 0639  Maintain Sequential Compression Device  Continuous                          Pharmalogical Order History:       None            CODE STATUS:    Code Status and Medical Interventions:   Ordered at: 07/24/23 0639     Level Of Support Discussed With:    Patient     Code Status (Patient has no pulse and is not breathing):    CPR (Attempt to Resuscitate)     Medical Interventions (Patient has pulse or is breathing):     Full Support     Release to patient:    Routine Release       This patient has been examined wearing personal protective equipment.     I discussed the patient's findings and my recommendations with patient, family, nursing staff, primary care team, and consulting provider.      Signature:Electronically signed by MYCHAL Parisi, 07/24/23, 1:23 PM EDT.          I spent 35 minutes caring for Marta on this date of service. This time includes time spent by me in the following activities: reviewing tests, obtaining and/or reviewing a separately obtained history, performing a medically appropriate examination and/or evaluation, counseling and educating the patient/family/caregiver, ordering medications, tests, or procedures, referring and communicating with other health care professionals, documenting information in the medical record, independently interpreting results and communicating that information with the patient/family/caregiver, and care coordination.

## 2023-07-25 ENCOUNTER — APPOINTMENT (OUTPATIENT)
Dept: NUCLEAR MEDICINE | Facility: HOSPITAL | Age: 53
DRG: 417 | End: 2023-07-25

## 2023-07-25 ENCOUNTER — INPATIENT HOSPITAL (OUTPATIENT)
Dept: URBAN - METROPOLITAN AREA HOSPITAL 84 | Facility: HOSPITAL | Age: 53
End: 2023-07-25

## 2023-07-25 DIAGNOSIS — F14.10 COCAINE ABUSE, UNCOMPLICATED: ICD-10-CM

## 2023-07-25 DIAGNOSIS — R93.3 ABNORMAL FINDINGS ON DIAGNOSTIC IMAGING OF OTHER PARTS OF DI: ICD-10-CM

## 2023-07-25 DIAGNOSIS — R10.11 RIGHT UPPER QUADRANT PAIN: ICD-10-CM

## 2023-07-25 DIAGNOSIS — R74.8 ABNORMAL LEVELS OF OTHER SERUM ENZYMES: ICD-10-CM

## 2023-07-25 DIAGNOSIS — R19.7 DIARRHEA, UNSPECIFIED: ICD-10-CM

## 2023-07-25 DIAGNOSIS — D72.829 ELEVATED WHITE BLOOD CELL COUNT, UNSPECIFIED: ICD-10-CM

## 2023-07-25 DIAGNOSIS — R11.2 NAUSEA WITH VOMITING, UNSPECIFIED: ICD-10-CM

## 2023-07-25 PROBLEM — K81.0 ACUTE CHOLECYSTITIS: Status: ACTIVE | Noted: 2023-07-23

## 2023-07-25 LAB
ALBUMIN SERPL-MCNC: 3.6 G/DL (ref 3.5–5.2)
ALBUMIN/GLOB SERPL: 1.5 G/DL
ALP SERPL-CCNC: 71 U/L (ref 39–117)
ALT SERPL W P-5'-P-CCNC: 33 U/L (ref 1–33)
ANION GAP SERPL CALCULATED.3IONS-SCNC: 9 MMOL/L (ref 5–15)
AST SERPL-CCNC: 23 U/L (ref 1–32)
BASOPHILS # BLD AUTO: 0 10*3/MM3 (ref 0–0.2)
BASOPHILS NFR BLD AUTO: 0.4 % (ref 0–1.5)
BILIRUB SERPL-MCNC: <0.2 MG/DL (ref 0–1.2)
BUN SERPL-MCNC: 14 MG/DL (ref 6–20)
BUN/CREAT SERPL: 17.9 (ref 7–25)
CALCIUM SPEC-SCNC: 8.8 MG/DL (ref 8.6–10.5)
CALPROTECTIN STL-MCNT: 38 UG/G (ref 0–120)
CHLORIDE SERPL-SCNC: 107 MMOL/L (ref 98–107)
CO2 SERPL-SCNC: 21 MMOL/L (ref 22–29)
CREAT SERPL-MCNC: 0.78 MG/DL (ref 0.57–1)
DEPRECATED RDW RBC AUTO: 45.5 FL (ref 37–54)
EGFRCR SERPLBLD CKD-EPI 2021: 90.4 ML/MIN/1.73
EOSINOPHIL # BLD AUTO: 0 10*3/MM3 (ref 0–0.4)
EOSINOPHIL NFR BLD AUTO: 0 % (ref 0.3–6.2)
ERYTHROCYTE [DISTWIDTH] IN BLOOD BY AUTOMATED COUNT: 13.9 % (ref 12.3–15.4)
ETHANOL UR-MCNC: NEGATIVE %
GLOBULIN UR ELPH-MCNC: 2.4 GM/DL
GLUCOSE SERPL-MCNC: 115 MG/DL (ref 65–99)
HCT VFR BLD AUTO: 42.6 % (ref 34–46.6)
HGB BLD-MCNC: 14.1 G/DL (ref 12–15.9)
LYMPHOCYTES # BLD AUTO: 1.2 10*3/MM3 (ref 0.7–3.1)
LYMPHOCYTES NFR BLD AUTO: 13.9 % (ref 19.6–45.3)
MCH RBC QN AUTO: 31.4 PG (ref 26.6–33)
MCHC RBC AUTO-ENTMCNC: 33 G/DL (ref 31.5–35.7)
MCV RBC AUTO: 95.2 FL (ref 79–97)
MONOCYTES # BLD AUTO: 0.1 10*3/MM3 (ref 0.1–0.9)
MONOCYTES NFR BLD AUTO: 1.2 % (ref 5–12)
NEUTROPHILS NFR BLD AUTO: 7.2 10*3/MM3 (ref 1.7–7)
NEUTROPHILS NFR BLD AUTO: 84.5 % (ref 42.7–76)
NRBC BLD AUTO-RTO: 0 /100 WBC (ref 0–0.2)
PLATELET # BLD AUTO: 265 10*3/MM3 (ref 140–450)
PMV BLD AUTO: 8.4 FL (ref 6–12)
POTASSIUM SERPL-SCNC: 4.5 MMOL/L (ref 3.5–5.2)
PROT SERPL-MCNC: 6 G/DL (ref 6–8.5)
RBC # BLD AUTO: 4.47 10*6/MM3 (ref 3.77–5.28)
SODIUM SERPL-SCNC: 137 MMOL/L (ref 136–145)
WBC NRBC COR # BLD: 8.6 10*3/MM3 (ref 3.4–10.8)

## 2023-07-25 PROCEDURE — 80053 COMPREHEN METABOLIC PANEL: CPT | Performed by: NURSE PRACTITIONER

## 2023-07-25 PROCEDURE — 99204 OFFICE O/P NEW MOD 45 MIN: CPT | Performed by: SURGERY

## 2023-07-25 PROCEDURE — 25010000002 HYDROMORPHONE 1 MG/ML SOLUTION

## 2023-07-25 PROCEDURE — G0378 HOSPITAL OBSERVATION PER HR: HCPCS

## 2023-07-25 PROCEDURE — 0 TECHNETIUM TC 99M MEBROFENIN KIT: Performed by: EMERGENCY MEDICINE

## 2023-07-25 PROCEDURE — A9537 TC99M MEBROFENIN: HCPCS | Performed by: EMERGENCY MEDICINE

## 2023-07-25 PROCEDURE — 25010000002 HYDROMORPHONE 1 MG/ML SOLUTION: Performed by: SURGERY

## 2023-07-25 PROCEDURE — 25010000002 METHYLPREDNISOLONE PER 40 MG: Performed by: NURSE PRACTITIONER

## 2023-07-25 PROCEDURE — 78227 HEPATOBIL SYST IMAGE W/DRUG: CPT

## 2023-07-25 PROCEDURE — 85025 COMPLETE CBC W/AUTO DIFF WBC: CPT | Performed by: NURSE PRACTITIONER

## 2023-07-25 PROCEDURE — 25010000002 CEFAZOLIN PER 500 MG: Performed by: SURGERY

## 2023-07-25 PROCEDURE — 99232 SBSQ HOSP IP/OBS MODERATE 35: CPT | Performed by: NURSE PRACTITIONER

## 2023-07-25 RX ORDER — KIT FOR THE PREPARATION OF TECHNETIUM TC 99M MEBROFENIN 45 MG/10ML
1 INJECTION, POWDER, LYOPHILIZED, FOR SOLUTION INTRAVENOUS
Status: COMPLETED | OUTPATIENT
Start: 2023-07-25 | End: 2023-07-25

## 2023-07-25 RX ORDER — HYDROCODONE BITARTRATE AND ACETAMINOPHEN 7.5; 325 MG/1; MG/1
1 TABLET ORAL EVERY 6 HOURS PRN
Status: DISCONTINUED | OUTPATIENT
Start: 2023-07-25 | End: 2023-07-27 | Stop reason: HOSPADM

## 2023-07-25 RX ADMIN — METHYLPREDNISOLONE SODIUM SUCCINATE 20 MG: 40 INJECTION, POWDER, FOR SOLUTION INTRAMUSCULAR; INTRAVENOUS at 06:34

## 2023-07-25 RX ADMIN — Medication 10 ML: at 21:52

## 2023-07-25 RX ADMIN — Medication 10 ML: at 09:24

## 2023-07-25 RX ADMIN — HYDROMORPHONE HYDROCHLORIDE 0.5 MG: 1 INJECTION, SOLUTION INTRAMUSCULAR; INTRAVENOUS; SUBCUTANEOUS at 21:52

## 2023-07-25 RX ADMIN — CEFAZOLIN 2000 MG: 2 INJECTION, POWDER, FOR SOLUTION INTRAMUSCULAR; INTRAVENOUS at 15:33

## 2023-07-25 RX ADMIN — SENNOSIDES AND DOCUSATE SODIUM 2 TABLET: 50; 8.6 TABLET ORAL at 23:47

## 2023-07-25 RX ADMIN — HYDROCODONE BITARTRATE AND ACETAMINOPHEN 1 TABLET: 7.5; 325 TABLET ORAL at 18:08

## 2023-07-25 RX ADMIN — HYDROCODONE BITARTRATE AND ACETAMINOPHEN 1 TABLET: 7.5; 325 TABLET ORAL at 09:23

## 2023-07-25 RX ADMIN — HYDROMORPHONE HYDROCHLORIDE 0.5 MG: 1 INJECTION, SOLUTION INTRAMUSCULAR; INTRAVENOUS; SUBCUTANEOUS at 13:57

## 2023-07-25 RX ADMIN — MEBROFENIN 1 DOSE: 45 INJECTION, POWDER, LYOPHILIZED, FOR SOLUTION INTRAVENOUS at 06:56

## 2023-07-25 RX ADMIN — SODIUM CHLORIDE 100 ML/HR: 9 INJECTION, SOLUTION INTRAVENOUS at 23:47

## 2023-07-25 NOTE — CONSULTS
"    [facility] Medicine Services   Consult Note    Patient Name: Marta Aguilar  : 1970  MRN: [id number]  Primary Care Physician:  Provider, No Known  Referring Physician: No ref. provider found  Date of admission: 2023  Date and Time of Care: 2023 at 1515    Inpatient Hospitalist Consult  Consult performed by: Jeannette Hernandez APRN  Consult ordered by: Oleg Horn PA-C        Subjective      Reason for Consult/ Chief Complaint: Medical management     Consult Requested By: Oleg Horn PA-C    History of Present Illness: Marta Aguilar is a 52 y.o. -American female with a history of Crohn's disease and chronic kidney disease who presents the emergency room with generalized abdominal pain, nausea and 3 bouts of vomiting that started yesterday. Patient states that she has not had fever, shortness of breath, chest pain or dizziness but feels \"extremely thirsty\" and is asking for something to drink. She denies hematochezia or melena but states that she \"feels like she is dying\" due to the amount of abdominal pain she is having. Patient does not have a primary care provider or gastroenterologist because she does not have insurance and \"cannot afford it.\" She does not take any medication on a daily basis and has no known drug allergies. Patient states that she has a history of crack cocaine use, with her last use about a week ago. She denies use of alcohol but states that she smokes about a pack of cigarettes over 3 days.     Observation 23: Patient is a 52-year-old female senting to the ED with abdominal pain, nausea and vomiting.  Patient states she was diagnosed with Crohn's disease in  has not been on medication due to insurance.  Patient reports vomiting yesterday without hemoptysis or melena.  Patient denies fever, chest pain, dyspnea or syncope.  Patient states pain is 8 out of 10 and radiates from her abdominal muscle wall into her back.   "   7/25/2023: Patient reports she continues to experience some abdominal pain primarily in the epigastric and right upper quadrant but no new or acute symptoms are reported    07/25/2023: Patient underwent HIDA scan that was suspicious for cystic duct obstruction or chronic gallbladder dysfunction. General surgery was consulted and plans for laparoscopic cholecystectomy on 7/26/2023.  Hospitalist contacted for inpatient medical management.    Patient was seen and evaluated in bed.  In no acute distress patient states that she continues to have right upper quadrant pain.  She denies any other complaints at this time.  Plan of care discussed with patient and she verbalizes understanding.  All questions and concerns addressed.       12 point ROS reviewed and negative except as mentioned above.      Personal History     Past Medical History:   Diagnosis Date    CKD (chronic kidney disease)     Crohn disease        History reviewed. No pertinent surgical history.    Family History: family history is not on file. Otherwise pertinent FHx was reviewed and not pertinent to current issue.    Social History:  reports that she has been smoking cigarettes. She has a 3.75 pack-year smoking history. She has never used smokeless tobacco. She reports current drug use. Drug: Cocaine(coke). She reports that she does not drink alcohol.    Home Medications:        Allergies:  No Known Allergies      Objective      Vitals:  Temp:  [97.6 °F (36.4 °C)-98.3 °F (36.8 °C)] 98.3 °F (36.8 °C)  Heart Rate:  [45-85] 50  Resp:  [12-16] 12  BP: (106-144)/(54-80) 120/66  Flow (L/min):  [2] 2    Physical Exam  Vitals reviewed.   Constitutional:       General: She is not in acute distress.     Appearance: Normal appearance.   HENT:      Head: Normocephalic and atraumatic.      Nose: Nose normal.      Mouth/Throat:      Mouth: Mucous membranes are moist.      Pharynx: Oropharynx is clear.   Eyes:      Extraocular Movements: Extraocular movements intact.       Conjunctiva/sclera: Conjunctivae normal.      Pupils: Pupils are equal, round, and reactive to light.   Cardiovascular:      Rate and Rhythm: Normal rate and regular rhythm.      Pulses: Normal pulses.      Heart sounds: Normal heart sounds.   Pulmonary:      Effort: Pulmonary effort is normal.      Breath sounds: Normal breath sounds.   Abdominal:      General: Abdomen is flat. Bowel sounds are normal.      Palpations: Abdomen is soft.      Tenderness: There is abdominal tenderness.   Genitourinary:     General: Normal vulva.      Rectum: Normal.   Musculoskeletal:         General: Normal range of motion.      Cervical back: Normal range of motion and neck supple.   Skin:     General: Skin is warm and dry.      Capillary Refill: Capillary refill takes less than 2 seconds.   Neurological:      General: No focal deficit present.      Mental Status: She is alert and oriented to person, place, and time.   Psychiatric:         Mood and Affect: Mood normal.         Behavior: Behavior normal.        Result Review    Result Review:  I have personally reviewed the results from the time of this admission to 7/25/2023 15:08 EDT and agree with these findings:  [x]  Laboratory  [x]  Microbiology  [x]  Radiology  []  EKG/Telemetry   []  Cardiology/Vascular   []  Pathology  [x]  Old records  []  Other:  Most notable findings include:       Assessment & Plan        Active Hospital Problems:  Active Hospital Problems    Diagnosis     **Abdominal pain     Acute cholecystitis      Plan:     Abdominal pain  - WBC 10.9 on admission, trending down 8.6 (07/25/2023)  - CT abdomen/pelvis showed, Mild gallbladder wall thickening which appears similar to previous study with no evidence of stones or biliary ductal dilation   - Amylase 300, lipase 29  - Clear liquid diet >>> NPO at midnight starting 07/26/2023  - Continue IV fluids 100 mL/hr  - IV/oral antiemetics and analgesics as needed  - Steroids initially  - GI consult, recommended  initiation of of steroids and HIDA scan which showed patent common bile duct with gallbladder activity not definitely identified during the 2-hour course of the exam suspicious for cystic duct obstruction or chronic gallbladder dysfunction  -General surgery consulted who recommend and plan laparoscopic cholecystectomy on 7/26/2023      Signature: Electronically signed by MYCHAL Muse, 07/25/23, 15:08 EDT.  [facility] Hospitalist Team

## 2023-07-25 NOTE — PROGRESS NOTES
LOS: 0 days   Patient Care Team:  Provider, No Known as PCP - General      Subjective     Interval History:     Subjective: Patient reports that she is feeling slightly better today.  Continues to have upper abdominal pain, but states that it is improved.  Reports that she has been tolerating liquids without vomiting.      ROS:   No chest pain, shortness of breath, or cough.        Medication Review:     Current Facility-Administered Medications:     acetaminophen (TYLENOL) tablet 650 mg, 650 mg, Oral, Q4H PRN, Delmy Bui APRN    sennosides-docusate (PERICOLACE) 8.6-50 MG per tablet 2 tablet, 2 tablet, Oral, BID, 2 tablet at 07/24/23 2247 **AND** polyethylene glycol (MIRALAX) packet 17 g, 17 g, Oral, Daily PRN **AND** bisacodyl (DULCOLAX) EC tablet 5 mg, 5 mg, Oral, Daily PRN **AND** bisacodyl (DULCOLAX) suppository 10 mg, 10 mg, Rectal, Daily PRN, Shaniqua Staley APRN    hydrALAZINE (APRESOLINE) injection 10 mg, 10 mg, Intravenous, Q6H PRN, Delmy Bui APRN    HYDROcodone-acetaminophen (NORCO) 7.5-325 MG per tablet 1 tablet, 1 tablet, Oral, Q6H PRN, Oleg Horn PA-C, 1 tablet at 07/25/23 0923    HYDROmorphone (DILAUDID) injection 0.5 mg, 0.5 mg, Intravenous, Q2H PRN, Shaniqua Staley APRN, 0.5 mg at 07/24/23 2247    ondansetron (ZOFRAN) tablet 4 mg, 4 mg, Oral, Q6H PRN **OR** ondansetron (ZOFRAN) injection 4 mg, 4 mg, Intravenous, Q6H PRN, Delmy Bui APRN    [COMPLETED] Insert Peripheral IV, , , Once **AND** sodium chloride 0.9 % flush 10 mL, 10 mL, Intravenous, PRN, Shaniqua Staley, APRN    sodium chloride 0.9 % flush 10 mL, 10 mL, Intravenous, Q12H, Shaniqua Staley, APRN, 10 mL at 07/25/23 0924    sodium chloride 0.9 % flush 10 mL, 10 mL, Intravenous, PRN, Shaniqua Staley, APRN    sodium chloride 0.9 % flush 10 mL, 10 mL, Intravenous, Q12H, Delmy Bui APRN, 10 mL at 07/25/23 0924    sodium chloride 0.9 % flush 10 mL, 10 mL, Intravenous, PRN, Delmy Bui, APRN    sodium  chloride 0.9 % infusion 40 mL, 40 mL, Intravenous, PRN, Shaniqua Staley, APRN    sodium chloride 0.9 % infusion, 100 mL/hr, Intravenous, Continuous, Shaniqua Staley, APRN, Last Rate: 100 mL/hr at 07/25/23 0924, 100 mL/hr at 07/25/23 0924      Objective     Vital Signs  Vitals:    07/24/23 2220 07/25/23 0240 07/25/23 0636 07/25/23 0911   BP: 144/73 117/65 135/74 119/54   BP Location: Left arm Left arm Right arm    Patient Position: Lying Lying Lying    Pulse: 51 85 (!) 47 (!) 45   Resp: 13 14 14    Temp: 97.6 °F (36.4 °C) 97.9 °F (36.6 °C) 97.7 °F (36.5 °C)    TempSrc: Oral Oral Oral    SpO2: 96% 100% 96% 100%   Weight:   50.8 kg (111 lb 15.9 oz)    Height:           Physical Exam:     General Appearance:    Awake and alert, in no acute distress   Head:    Normocephalic, without obvious abnormality   Eyes:          Conjunctivae normal, anicteric sclera   Throat:   No oral lesions, no thrush, oral mucosa moist   Neck:   No adenopathy, supple, no JVD   Lungs:     respirations regular, even and unlabored   Abdomen:     Soft, upper abdominal tenderness, no rebound or guarding, non-distended   Rectal:     Deferred   Extremities:   No edema, no cyanosis   Skin:   No bruising or rash, no jaundice        Results Review:    CBC    Results from last 7 days   Lab Units 07/25/23  0431 07/24/23  0509 07/23/23 2016   WBC 10*3/mm3 8.60 11.20* 10.90*   HEMOGLOBIN g/dL 14.1 12.9 15.9   PLATELETS 10*3/mm3 265 277 250     CMP   Results from last 7 days   Lab Units 07/25/23  0431 07/24/23  0509 07/23/23 2016   SODIUM mmol/L 137 137 142   POTASSIUM mmol/L 4.5 4.2 4.3   CHLORIDE mmol/L 107 105 109*   CO2 mmol/L 21.0* 22.0 19.0*   BUN mg/dL 14 15 20   CREATININE mg/dL 0.78 0.84 1.10*   GLUCOSE mg/dL 115* 84 109*   ALBUMIN g/dL 3.6  --  4.6   BILIRUBIN mg/dL <0.2  --  0.2   ALK PHOS U/L 71  --  89   AST (SGOT) U/L 23  --  20   ALT (SGPT) U/L 33  --  18   MAGNESIUM mg/dL  --   --  2.3   AMYLASE U/L  --  300*  --    LIPASE U/L  --   --   29     Cr Clearance Estimated Creatinine Clearance: 66.1 mL/min (by C-G formula based on SCr of 0.78 mg/dL).  Coag     HbA1C   Lab Results   Component Value Date    HGBA1C 5.50 07/24/2023     Blood Glucose No results found for: POCGLU  Infection     UA    Results from last 7 days   Lab Units 07/23/23  2319   NITRITE UA  Negative   WBC UA /HPF 0-2*   BACTERIA UA /HPF None Seen   SQUAM EPITHEL UA /HPF 0-2     Radiology(recent) CT Abdomen Pelvis With Contrast    Result Date: 7/23/2023  Impression: 1. Mild gallbladder wall thickening seen near the fundus which appears similar as compared to the previous study. No evidence of stones or biliary ductal dilatation. Otherwise, no acute intra-abdominal or intrapelvic process. 2. Ancillary findings as described above. Electronically Signed: Amber Ruby  7/23/2023 9:29 PM EDT  Workstation ID: [id number]    NM HIDA SCAN WITH PHARMACOLOGICAL INTERVENTION    Result Date: 7/25/2023  Impression: 1. Patent common bile duct. 2. Gallbladder activity is not definitely identified during the 2-hour course of the exam, suspicious for cystic duct obstruction or chronic gallbladder dysfunction. Electronically Signed: Britton Mabry  7/25/2023 9:10 AM EDT  Workstation ID: [id number]        Assessment & Plan     ASSESSMENT:  -Upper abdominal pain  -Nausea/vomiting  -Diarrhea  -Abnormal CT showing mild gallbladder wall thickening  -Leukocytosis  -CKD     PLAN:  Patient is a 52-year-old female with history of Crohn's colitis diagnosed in 2013 who presented on 7/23 with complaints of abdominal pain and nausea/vomiting.  She has not been on any maintenance medications for Crohn's disease due to not having insurance.    Patient reports that she is feeling some better today.  Reports abdominal pain is improving.  Has been tolerating clear liquids.  HIDA scan this morning shows no identified gallbladder activity which is suspicious for cystic duct obstruction or chronic gallbladder dysfunction.  We will  consult general surgery for possible laparoscopic cholecystectomy.  Will discontinue IV steroids as there has not been any evidence of Crohn's flare on imaging.  Inflammatory markers normal.  Patient would benefit from outpatient colonoscopy following discharge.  Antiemetics/analgesics as needed.  Not much else to add from a GI standpoint at this time.  GI will be available as needed.      Electronically signed by MYCHAL Benedict, 07/25/23, 10:43 AM EDT.

## 2023-07-25 NOTE — CASE MANAGEMENT/SOCIAL WORK
Continued Stay Note   [facility]     Patient Name: Marta Aguilar  MRN: [id number]  Today's Date: 7/25/2023    Admit Date: 7/23/2023    Plan: DC plan: From a friend's house. Will return to friend's house. Friend will transport   Discharge Plan       Row Name 07/25/23 0845       Plan    Plan DC plan: From a friend's house. Will return to friend's house. Friend will transport    Plan Comments Barriers: Hida scan today. IV Solu-Medrol. IVFs. At discharge Ms. Aguilar plans to return home with a friend. No anticipated discharge needs                      Phone communication or documentation only - no physical contact with patient or family.     Noni MATHUR,RN Case Manager  [facility]  Phone: Desk- [phone] cell- [phone]

## 2023-07-25 NOTE — PROGRESS NOTES
"[facility] Observation Unit Progress Note    Patient Name: Marta Aguilar  : 1970  MRN: [id number]  Primary Care Physician: Provider, No Known  Date of admission: 2023     Patient Care Team:  Provider, No Known as PCP - General          Subjective   History Present Illness     Chief Complaint:   Chief Complaint   Patient presents with    Abdominal Pain         History of Present Illness  Ms. Aguilar is a 52 y.o.  presents to [facility] complaining of abdominal pain        History of Present Illness     ED 23: Patient is a 52-year-old -American female with a history of Crohn's disease and chronic kidney disease who presents the emergency room with generalized abdominal pain, nausea and 3 bouts of vomiting that started yesterday. Patient states that she has not had fever, shortness of breath, chest pain or dizziness but feels \"extremely thirsty\" and is asking for something to drink. She denies hematochezia or melena but states that she \"feels like she is dying\" due to the amount of abdominal pain she is having. Patient does not have a primary care provider or gastroenterologist because she does not have insurance and \"cannot afford it.\" She does not take any medication on a daily basis and has no known drug allergies. Patient states that she has a history of crack cocaine use, with her last use about a week ago. She denies use of alcohol but states that she smokes about a pack of cigarettes over 3 days.      Observation 23: Patient is a 52-year-old female senting to the ED with abdominal pain, nausea and vomiting.  Patient states she was diagnosed with Crohn's disease in  has not been on medication due to insurance.  Patient reports vomiting yesterday without hemoptysis or melena.  Patient denies fever, chest pain, dyspnea or syncope.  Patient states pain is 8 out of 10 and radiates from her abdominal muscle wall into her back.     2023: Patient reports she continues to " experience some abdominal pain primarily in the epigastric and right upper quadrant but no new or acute symptoms are reported      ROS  Review of Systems   Constitutional: Positive for decreased appetite and malaise/fatigue.   HENT: Negative.     Eyes: Negative.    Cardiovascular: Negative.    Respiratory: Negative.     Endocrine: Negative.    Hematologic/Lymphatic: Negative.    Skin: Negative.    Musculoskeletal:  Positive for back pain.   Gastrointestinal:  Positive for abdominal pain and nausea.   Genitourinary: Negative.    Neurological: Negative.    Psychiatric/Behavioral: Negative.     Allergic/Immunologic: Negative.         Personal History     Past Medical History:   Past Medical History:   Diagnosis Date    CKD (chronic kidney disease)     Crohn disease        Surgical History:    History reviewed. No pertinent surgical history.        Family History: family history is not on file. Otherwise pertinent FHx was reviewed and unremarkable.     Social History:  reports that she has been smoking cigarettes. She has a 3.75 pack-year smoking history. She has never used smokeless tobacco. She reports current drug use. Drug: Cocaine(coke). She reports that she does not drink alcohol.      Medications:  Prior to Admission medications    Not on File       Allergies:  No Known Allergies    Objective   Objective     Vital Signs  Temp:  [97.6 °F (36.4 °C)-97.9 °F (36.6 °C)] 97.9 °F (36.6 °C)  Heart Rate:  [45-85] 46  Resp:  [13-16] 16  BP: (106-144)/(54-80) 106/80  SpO2:  [96 %-100 %] 98 %  on  Flow (L/min):  [2] 2;   Device (Oxygen Therapy): room air  Body mass index is 18.64 kg/m².    Physical Exam  Constitutional:       General: She is not in acute distress.     Appearance: Normal appearance. She is not ill-appearing, toxic-appearing or diaphoretic.   HENT:      Head: Normocephalic.      Right Ear: External ear normal.      Left Ear: External ear normal.      Nose: Nose normal.      Mouth/Throat:      Mouth: Mucous  membranes are moist.   Eyes:      Extraocular Movements: Extraocular movements intact.   Cardiovascular:      Rate and Rhythm: Normal rate and regular rhythm.      Pulses: Normal pulses.   Pulmonary:      Effort: Pulmonary effort is normal.      Breath sounds: Normal breath sounds.   Abdominal:      General: Bowel sounds are normal.      Palpations: Abdomen is soft.      Tenderness: There is abdominal tenderness.   Musculoskeletal:      Cervical back: Normal range of motion.      Right lower leg: No edema.      Left lower leg: No edema.   Skin:     General: Skin is warm and dry.      Capillary Refill: Capillary refill takes less than 2 seconds.   Neurological:      General: No focal deficit present.      Mental Status: She is alert.   Psychiatric:         Mood and Affect: Mood normal.         Behavior: Behavior normal.         Thought Content: Thought content normal.         Judgment: Judgment normal.         Results Review:  I have personally reviewed most recent lab results and radiology images and interpretations and agree with findings, most notably: CMP, CBC, amylase, lipase and HIDA scan result.    Results from last 7 days   Lab Units 07/25/23  0431   WBC 10*3/mm3 8.60   HEMOGLOBIN g/dL 14.1   HEMATOCRIT % 42.6   PLATELETS 10*3/mm3 265     Results from last 7 days   Lab Units 07/25/23  0431   SODIUM mmol/L 137   POTASSIUM mmol/L 4.5   CHLORIDE mmol/L 107   CO2 mmol/L 21.0*   BUN mg/dL 14   CREATININE mg/dL 0.78   GLUCOSE mg/dL 115*   CALCIUM mg/dL 8.8   ALT (SGPT) U/L 33   AST (SGOT) U/L 23     Estimated Creatinine Clearance: 66.1 mL/min (by C-G formula based on SCr of 0.78 mg/dL).  Brief Urine Lab Results  (Last result in the past 365 days)        Color   Clarity   Blood   Leuk Est   Nitrite   Protein   CREAT   Urine HCG        07/23/23 2311 Yellow   Clear   Trace   Negative   Negative   Negative                   Microbiology Results (last 10 days)       ** No results found for the last 240 hours. **             ECG/EMG Results (most recent)       Procedure Component Value Units Date/Time    SCANNED - TELEMETRY   [[id number]] Resulted: 07/23/23     Updated: 07/24/23 0934    SCANNED - TELEMETRY   [[id number]] Resulted: 07/23/23     Updated: 07/24/23 0946    SCANNED - TELEMETRY   [[id number]] Resulted: 07/23/23     Updated: 07/25/23 0552    SCANNED - TELEMETRY   [[id number]] Resulted: 07/23/23     Updated: 07/25/23 0552    SCANNED - TELEMETRY   [[id number]] Resulted: 07/23/23     Updated: 07/25/23 0640    SCANNED - TELEMETRY   [[id number]] Resulted: 07/23/23     Updated: 07/25/23 0652    SCANNED - TELEMETRY   [[id number]] Resulted: 07/23/23     Updated: 07/25/23 0702                    CT Abdomen Pelvis With Contrast    Result Date: 7/23/2023  Impression: 1. Mild gallbladder wall thickening seen near the fundus which appears similar as compared to the previous study. No evidence of stones or biliary ductal dilatation. Otherwise, no acute intra-abdominal or intrapelvic process. 2. Ancillary findings as described above. Electronically Signed: Amber Ruby  7/23/2023 9:29 PM EDT  Workstation ID: [id number]    NM HIDA SCAN WITH PHARMACOLOGICAL INTERVENTION    Result Date: 7/25/2023  Impression: 1. Patent common bile duct. 2. Gallbladder activity is not definitely identified during the 2-hour course of the exam, suspicious for cystic duct obstruction or chronic gallbladder dysfunction. Electronically Signed: Britton Mabry  7/25/2023 9:10 AM EDT  Workstation ID: [id number]       Estimated Creatinine Clearance: 66.1 mL/min (by C-G formula based on SCr of 0.78 mg/dL).    Assessment & Plan   Assessment/Plan       Active Hospital Problems    Diagnosis  POA    **Abdominal pain [R10.9]  Yes    Acute cholecystitis [K81.0]  Unknown      Resolved Hospital Problems   No resolved problems to display.     Abdominal pain            Lab Results   Component Value Date     WBC 11.20 (H) 07/24/2023     AST 20 07/23/2023     ALT 18 07/23/2023     ALKPHOS 89  07/23/2023     BILITOT 0.2 07/23/2023     LIPASE 29 07/23/2023   -WBCs trended to 8.60 on 7/25/2023-CT of abdomen and pelvis: Mild gallbladder wall thickening which appears similar to previous study with no evidence of stones or biliary ductal dilation  -Amylase 300, lipase 29  -Clear liquid diet  -Continue IV fluids  -IV/oral antiemetics and analgesics as needed  -Steroids initially  -GI consult, recommended initiation of of steroids and HIDA scan which showed patent common bile duct with gallbladder activity not definitely identified during the 2-hour course of the exam suspicious for cystic duct obstruction or chronic gallbladder dysfunction  -General surgery consulted who recommend and plan laparoscopic cholecystectomy on 7/26/2023  -Hospitalist consulted            VTE Prophylaxis -   Mechanical Order History:        Ordered        07/25/23 1318  Place Sequential Compression Device on Patient in Pre-Op  Once            07/24/23 0639  Place Sequential Compression Device  Once            07/24/23 0639  Maintain Sequential Compression Device  Continuous                          Pharmalogical Order History:       None            CODE STATUS:    Code Status and Medical Interventions:   Ordered at: 07/24/23 0639     Level Of Support Discussed With:    Patient     Code Status (Patient has no pulse and is not breathing):    CPR (Attempt to Resuscitate)     Medical Interventions (Patient has pulse or is breathing):    Full Support     Release to patient:    Routine Release       This patient has been examined wearing personal protective equipment.     I discussed the patient's findings and my recommendations with patient and nursing staff.      Signature:Electronically signed by Oleg Horn PA-C, 07/25/23, 2:38 PM EDT.

## 2023-07-25 NOTE — CONSULTS
General Surgery Consult Note      Name: Marta Aguilar ADMIT: 2023   : 1970  PCP: Provider, No Known    MRN: [id number] LOS: 0 days   AGE/SEX: 52 y.o. female  ROOM: [address] [place] [address]      Patient Care Team:  Provider, No Known as PCP - General  Chief Complaint   Patient presents with    Abdominal Pain       Subjective   52-year-old lady with a history of Crohn's disease has been in the hospital with 2 to 3 days worth of pain which begins in her central back and radiates around to her anterior abdomen.  She has been having pain like this for years.  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  She has nausea without significant emesis no significant change in her bowel function.  She says that she has been worked up for gallbladder before and was offered removal but could not get to an elective procedure.  On arrival here she was a treated for possible Crohn's flare steroids were initiated.  She still has her symptoms.  Gallbladder showed some mild gallbladder wall thickening which was stable compared to previous exams and the HIDA scan did not show any filling of the gallbladder concerning for acute or chronic cholecystitis was asked to take her gallbladder out.    Past Medical History:   Diagnosis Date    CKD (chronic kidney disease)     Crohn disease      History reviewed. No pertinent surgical history.  History reviewed. No pertinent family history.    Social History     Tobacco Use    Smoking status: Every Day     Packs/day: 0.25     Years: 15.00     Pack years: 3.75     Types: Cigarettes    Smokeless tobacco: Never   Vaping Use    Vaping Use: Never used   Substance Use Topics    Alcohol use: Never    Drug use: Yes     Types: Cocaine(coke)     No medications prior to admission.     senna-docusate sodium, 2 tablet, Oral, BID  sodium chloride, 10 mL, Intravenous, Q12H  sodium chloride, 10 mL, Intravenous, Q12H      sodium chloride, 100 mL/hr, Last Rate: 100 mL/hr  (07/25/23 0924)        acetaminophen    senna-docusate sodium **AND** polyethylene glycol **AND** bisacodyl **AND** bisacodyl    hydrALAZINE    HYDROcodone-acetaminophen    HYDROmorphone    ondansetron **OR** ondansetron    [COMPLETED] Insert Peripheral IV **AND** sodium chloride    sodium chloride    sodium chloride    sodium chloride  Patient has no known allergies.    Review of Systems   Constitutional:  Negative for chills and fever.   HENT:  Negative for sore throat and trouble swallowing.    Eyes:  Negative for blurred vision and double vision.   Respiratory:  Positive for shortness of breath. Negative for cough.    Cardiovascular:  Positive for chest pain. Negative for leg swelling.   Gastrointestinal:  Positive for abdominal pain, nausea and vomiting. Negative for abdominal distention, blood in stool, constipation and diarrhea.   Genitourinary:  Negative for dysuria and hematuria.   Skin:  Negative for rash and wound.   Neurological:  Negative for dizziness and confusion.   Psychiatric/Behavioral:  Negative for agitation and depressed mood.       Objective     Vital Signs and Labs:  Vital Signs Patient Vitals for the past 24 hrs:   BP Temp Temp src Pulse Resp SpO2 Weight   07/25/23 1045 106/80 97.9 °F (36.6 °C) Oral (!) 46 16 98 % --   07/25/23 0911 119/54 -- -- (!) 45 -- 100 % --   07/25/23 0636 135/74 97.7 °F (36.5 °C) Oral (!) 47 14 96 % 50.8 kg (111 lb 15.9 oz)   07/25/23 0240 117/65 97.9 °F (36.6 °C) Oral 85 14 100 % --   07/24/23 2220 144/73 97.6 °F (36.4 °C) Oral 51 13 96 % --       Physical Exam:  Physical Exam  Constitutional:       Appearance: Normal appearance.   HENT:      Head: Normocephalic and atraumatic.   Cardiovascular:      Rate and Rhythm: Normal rate.   Pulmonary:      Effort: Pulmonary effort is normal. No respiratory distress.   Abdominal:      General: There is no distension.      Palpations: Abdomen is soft.      Tenderness: There is no abdominal tenderness.   Skin:     General: Skin  is warm and dry.   Neurological:      General: No focal deficit present.      Mental Status: She is alert. Mental status is at baseline.   Psychiatric:         Mood and Affect: Mood normal.         Behavior: Behavior normal.       CBC    Results from last 7 days   Lab Units 07/25/23  0431 07/24/23  0509 07/23/23 2016   WBC 10*3/mm3 8.60 11.20* 10.90*   HEMOGLOBIN g/dL 14.1 12.9 15.9   PLATELETS 10*3/mm3 265 277 250     CMP   Results from last 7 days   Lab Units 07/25/23  0431 07/24/23  0509 07/23/23 2016   SODIUM mmol/L 137 137 142   POTASSIUM mmol/L 4.5 4.2 4.3   CHLORIDE mmol/L 107 105 109*   CO2 mmol/L 21.0* 22.0 19.0*   BUN mg/dL 14 15 20   CREATININE mg/dL 0.78 0.84 1.10*   GLUCOSE mg/dL 115* 84 109*   ALBUMIN g/dL 3.6  --  4.6   BILIRUBIN mg/dL <0.2  --  0.2   ALK PHOS U/L 71  --  89   AST (SGOT) U/L 23  --  20   ALT (SGPT) U/L 33  --  18   AMYLASE U/L  --  300*  --    LIPASE U/L  --   --  29     Radiology(recent) CT Abdomen Pelvis With Contrast    Result Date: 7/23/2023  Impression: 1. Mild gallbladder wall thickening seen near the fundus which appears similar as compared to the previous study. No evidence of stones or biliary ductal dilatation. Otherwise, no acute intra-abdominal or intrapelvic process. 2. Ancillary findings as described above. Electronically Signed: Amber Ruby  7/23/2023 9:29 PM EDT  Workstation ID: [id number]    NM HIDA SCAN WITH PHARMACOLOGICAL INTERVENTION    Result Date: 7/25/2023  Impression: 1. Patent common bile duct. 2. Gallbladder activity is not definitely identified during the 2-hour course of the exam, suspicious for cystic duct obstruction or chronic gallbladder dysfunction. Electronically Signed: Britton Mabry  7/25/2023 9:10 AM EDT  Workstation ID: [id number]     I reviewed the patient's new clinical results.  I reviewed the patient's new imaging results and agree with the interpretation.    Assessment & Plan       Abdominal pain      52 y.o. female admitted with back pain with  radiation around to her right upper quadrant.  Imaging concerning for acute or chronic cholecystitis    Counseled her about the anticipated diagnosis of acute or chronic cholecystitis the treatment options surgical and nonsurgical we talked about the steps of the operation anticipated cover the possible complications.  After our discussion about the risk and benefits of the surgery she does wish to move forward with cholecystectomy.      This note was created using Dragon Voice Recognition software.    Juve Hudson MD  07/25/23  13:20 EDT

## 2023-07-25 NOTE — PLAN OF CARE
Goal Outcome Evaluation:  Plan of Care Reviewed With: patient        Progress: no change  Outcome Evaluation: Pt was evaluated by MD Tristan after HIDA scan. Pt continued on IV fluids throughout the day and pain was controlled with meds. Pt is being scheduled for a lap cholecystectomy tomorrow at 1600. Pt is on a clear liquid diet until midnight tonight. Consent has been signed by patient and dayshift RN.

## 2023-07-25 NOTE — PLAN OF CARE
Goal Outcome Evaluation:  Plan of Care Reviewed With: patient        Progress: improving  Outcome Evaluation: Patient is currently NPO for a HIDA , no pain medication after midnight. VVS, chest rise and fall are noted. Care continues.

## 2023-07-26 ENCOUNTER — ANESTHESIA EVENT (OUTPATIENT)
Dept: PERIOP | Facility: HOSPITAL | Age: 53
DRG: 417 | End: 2023-07-26

## 2023-07-26 ENCOUNTER — ANESTHESIA (OUTPATIENT)
Dept: PERIOP | Facility: HOSPITAL | Age: 53
DRG: 417 | End: 2023-07-26

## 2023-07-26 LAB
ANION GAP SERPL CALCULATED.3IONS-SCNC: 9 MMOL/L (ref 5–15)
BASOPHILS # BLD AUTO: 0 10*3/MM3 (ref 0–0.2)
BASOPHILS # BLD AUTO: 0.1 10*3/MM3 (ref 0–0.2)
BASOPHILS NFR BLD AUTO: 0.2 % (ref 0–1.5)
BASOPHILS NFR BLD AUTO: 0.9 % (ref 0–1.5)
BUN SERPL-MCNC: 12 MG/DL (ref 6–20)
BUN/CREAT SERPL: 14.6 (ref 7–25)
CALCIUM SPEC-SCNC: 8.3 MG/DL (ref 8.6–10.5)
CHLORIDE SERPL-SCNC: 105 MMOL/L (ref 98–107)
CO2 SERPL-SCNC: 23 MMOL/L (ref 22–29)
CREAT SERPL-MCNC: 0.82 MG/DL (ref 0.57–1)
DEPRECATED RDW RBC AUTO: 43.8 FL (ref 37–54)
DEPRECATED RDW RBC AUTO: 44.2 FL (ref 37–54)
EGFRCR SERPLBLD CKD-EPI 2021: 85.1 ML/MIN/1.73
EOSINOPHIL # BLD AUTO: 0.1 10*3/MM3 (ref 0–0.4)
EOSINOPHIL # BLD AUTO: 0.1 10*3/MM3 (ref 0–0.4)
EOSINOPHIL NFR BLD AUTO: 0.7 % (ref 0.3–6.2)
EOSINOPHIL NFR BLD AUTO: 1 % (ref 0.3–6.2)
ERYTHROCYTE [DISTWIDTH] IN BLOOD BY AUTOMATED COUNT: 13.4 % (ref 12.3–15.4)
ERYTHROCYTE [DISTWIDTH] IN BLOOD BY AUTOMATED COUNT: 13.5 % (ref 12.3–15.4)
GLUCOSE SERPL-MCNC: 83 MG/DL (ref 65–99)
HCT VFR BLD AUTO: 41.6 % (ref 34–46.6)
HCT VFR BLD AUTO: 42.7 % (ref 34–46.6)
HGB BLD-MCNC: 13.7 G/DL (ref 12–15.9)
HGB BLD-MCNC: 14.1 G/DL (ref 12–15.9)
LYMPHOCYTES # BLD AUTO: 2.9 10*3/MM3 (ref 0.7–3.1)
LYMPHOCYTES # BLD AUTO: 3.3 10*3/MM3 (ref 0.7–3.1)
LYMPHOCYTES NFR BLD AUTO: 23.3 % (ref 19.6–45.3)
LYMPHOCYTES NFR BLD AUTO: 34.4 % (ref 19.6–45.3)
MCH RBC QN AUTO: 31 PG (ref 26.6–33)
MCH RBC QN AUTO: 31.2 PG (ref 26.6–33)
MCHC RBC AUTO-ENTMCNC: 33 G/DL (ref 31.5–35.7)
MCHC RBC AUTO-ENTMCNC: 33.1 G/DL (ref 31.5–35.7)
MCV RBC AUTO: 93.9 FL (ref 79–97)
MCV RBC AUTO: 94.8 FL (ref 79–97)
MONOCYTES # BLD AUTO: 0.6 10*3/MM3 (ref 0.1–0.9)
MONOCYTES # BLD AUTO: 0.6 10*3/MM3 (ref 0.1–0.9)
MONOCYTES NFR BLD AUTO: 5 % (ref 5–12)
MONOCYTES NFR BLD AUTO: 6.4 % (ref 5–12)
NEUTROPHILS NFR BLD AUTO: 5.4 10*3/MM3 (ref 1.7–7)
NEUTROPHILS NFR BLD AUTO: 57.3 % (ref 42.7–76)
NEUTROPHILS NFR BLD AUTO: 70.8 % (ref 42.7–76)
NEUTROPHILS NFR BLD AUTO: 8.7 10*3/MM3 (ref 1.7–7)
NRBC BLD AUTO-RTO: 0 /100 WBC (ref 0–0.2)
NRBC BLD AUTO-RTO: 0.1 /100 WBC (ref 0–0.2)
PLATELET # BLD AUTO: 241 10*3/MM3 (ref 140–450)
PLATELET # BLD AUTO: 290 10*3/MM3 (ref 140–450)
PMV BLD AUTO: 8 FL (ref 6–12)
PMV BLD AUTO: 8.2 FL (ref 6–12)
POTASSIUM SERPL-SCNC: 3.4 MMOL/L (ref 3.5–5.2)
RBC # BLD AUTO: 4.39 10*6/MM3 (ref 3.77–5.28)
RBC # BLD AUTO: 4.55 10*6/MM3 (ref 3.77–5.28)
SODIUM SERPL-SCNC: 137 MMOL/L (ref 136–145)
WBC NRBC COR # BLD: 12.3 10*3/MM3 (ref 3.4–10.8)
WBC NRBC COR # BLD: 9.5 10*3/MM3 (ref 3.4–10.8)

## 2023-07-26 PROCEDURE — 85025 COMPLETE CBC W/AUTO DIFF WBC: CPT | Performed by: SURGERY

## 2023-07-26 PROCEDURE — 25010000002 CEFAZOLIN PER 500 MG: Performed by: SURGERY

## 2023-07-26 PROCEDURE — G0378 HOSPITAL OBSERVATION PER HR: HCPCS

## 2023-07-26 PROCEDURE — 47562 LAPAROSCOPIC CHOLECYSTECTOMY: CPT | Performed by: SURGERY

## 2023-07-26 PROCEDURE — 88304 TISSUE EXAM BY PATHOLOGIST: CPT | Performed by: SURGERY

## 2023-07-26 PROCEDURE — 0FT44ZZ RESECTION OF GALLBLADDER, PERCUTANEOUS ENDOSCOPIC APPROACH: ICD-10-PCS | Performed by: SURGERY

## 2023-07-26 PROCEDURE — 25010000002 FENTANYL CITRATE (PF) 100 MCG/2ML SOLUTION: Performed by: NURSE ANESTHETIST, CERTIFIED REGISTERED

## 2023-07-26 PROCEDURE — 25010000002 BUPIVACAINE (PF) 0.25 % SOLUTION: Performed by: SURGERY

## 2023-07-26 PROCEDURE — 25010000002 HYDROMORPHONE 1 MG/ML SOLUTION: Performed by: SURGERY

## 2023-07-26 PROCEDURE — 80048 BASIC METABOLIC PNL TOTAL CA: CPT | Performed by: SURGERY

## 2023-07-26 PROCEDURE — 47562 LAPAROSCOPIC CHOLECYSTECTOMY: CPT | Performed by: NURSE PRACTITIONER

## 2023-07-26 PROCEDURE — 25010000002 SUGAMMADEX 200 MG/2ML SOLUTION: Performed by: NURSE ANESTHETIST, CERTIFIED REGISTERED

## 2023-07-26 PROCEDURE — 85025 COMPLETE CBC W/AUTO DIFF WBC: CPT | Performed by: NURSE PRACTITIONER

## 2023-07-26 PROCEDURE — 25010000002 HYDROMORPHONE 1 MG/ML SOLUTION

## 2023-07-26 PROCEDURE — 80048 BASIC METABOLIC PNL TOTAL CA: CPT | Performed by: NURSE PRACTITIONER

## 2023-07-26 PROCEDURE — 25010000002 KETOROLAC TROMETHAMINE PER 15 MG

## 2023-07-26 PROCEDURE — 25010000002 FENTANYL CITRATE (PF) 50 MCG/ML SOLUTION: Performed by: NURSE ANESTHETIST, CERTIFIED REGISTERED

## 2023-07-26 PROCEDURE — 25010000002 DEXAMETHASONE PER 1 MG: Performed by: NURSE ANESTHETIST, CERTIFIED REGISTERED

## 2023-07-26 PROCEDURE — 25010000002 HYDROMORPHONE 1 MG/ML SOLUTION: Performed by: NURSE ANESTHETIST, CERTIFIED REGISTERED

## 2023-07-26 DEVICE — LIGAMAX 5 MM ENDOSCOPIC MULTIPLE CLIP APPLIER
Type: IMPLANTABLE DEVICE | Site: ABDOMEN | Status: FUNCTIONAL
Brand: LIGAMAX

## 2023-07-26 RX ORDER — DIPHENHYDRAMINE HYDROCHLORIDE 50 MG/ML
12.5 INJECTION INTRAMUSCULAR; INTRAVENOUS
Status: DISCONTINUED | OUTPATIENT
Start: 2023-07-26 | End: 2023-07-26 | Stop reason: HOSPADM

## 2023-07-26 RX ORDER — SODIUM CHLORIDE 0.9 % (FLUSH) 0.9 %
10 SYRINGE (ML) INJECTION AS NEEDED
Status: DISCONTINUED | OUTPATIENT
Start: 2023-07-26 | End: 2023-07-26 | Stop reason: HOSPADM

## 2023-07-26 RX ORDER — DEXTROSE, SODIUM CHLORIDE, SODIUM LACTATE, POTASSIUM CHLORIDE, AND CALCIUM CHLORIDE 5; .6; .31; .03; .02 G/100ML; G/100ML; G/100ML; G/100ML; G/100ML
100 INJECTION, SOLUTION INTRAVENOUS CONTINUOUS
Status: DISCONTINUED | OUTPATIENT
Start: 2023-07-26 | End: 2023-07-27

## 2023-07-26 RX ORDER — LIDOCAINE HYDROCHLORIDE 20 MG/ML
INJECTION, SOLUTION EPIDURAL; INFILTRATION; INTRACAUDAL; PERINEURAL AS NEEDED
Status: DISCONTINUED | OUTPATIENT
Start: 2023-07-26 | End: 2023-07-26 | Stop reason: SURG

## 2023-07-26 RX ORDER — ACETAMINOPHEN 650 MG/1
325 SUPPOSITORY RECTAL EVERY 4 HOURS PRN
Status: DISCONTINUED | OUTPATIENT
Start: 2023-07-26 | End: 2023-07-26 | Stop reason: HOSPADM

## 2023-07-26 RX ORDER — ENOXAPARIN SODIUM 100 MG/ML
40 INJECTION SUBCUTANEOUS EVERY 24 HOURS
Status: DISCONTINUED | OUTPATIENT
Start: 2023-07-26 | End: 2023-07-27 | Stop reason: HOSPADM

## 2023-07-26 RX ORDER — DEXAMETHASONE SODIUM PHOSPHATE 4 MG/ML
INJECTION, SOLUTION INTRA-ARTICULAR; INTRALESIONAL; INTRAMUSCULAR; INTRAVENOUS; SOFT TISSUE AS NEEDED
Status: DISCONTINUED | OUTPATIENT
Start: 2023-07-26 | End: 2023-07-26 | Stop reason: SURG

## 2023-07-26 RX ORDER — MIDAZOLAM HYDROCHLORIDE 1 MG/ML
2 INJECTION INTRAMUSCULAR; INTRAVENOUS
Status: DISCONTINUED | OUTPATIENT
Start: 2023-07-26 | End: 2023-07-26 | Stop reason: HOSPADM

## 2023-07-26 RX ORDER — ONDANSETRON 2 MG/ML
4 INJECTION INTRAMUSCULAR; INTRAVENOUS ONCE AS NEEDED
Status: DISCONTINUED | OUTPATIENT
Start: 2023-07-26 | End: 2023-07-26 | Stop reason: HOSPADM

## 2023-07-26 RX ORDER — IPRATROPIUM BROMIDE AND ALBUTEROL SULFATE 2.5; .5 MG/3ML; MG/3ML
3 SOLUTION RESPIRATORY (INHALATION) ONCE AS NEEDED
Status: DISCONTINUED | OUTPATIENT
Start: 2023-07-26 | End: 2023-07-26 | Stop reason: HOSPADM

## 2023-07-26 RX ORDER — EPHEDRINE SULFATE 5 MG/ML
INJECTION INTRAVENOUS AS NEEDED
Status: DISCONTINUED | OUTPATIENT
Start: 2023-07-26 | End: 2023-07-26 | Stop reason: SURG

## 2023-07-26 RX ORDER — LABETALOL HYDROCHLORIDE 5 MG/ML
5 INJECTION, SOLUTION INTRAVENOUS
Status: DISCONTINUED | OUTPATIENT
Start: 2023-07-26 | End: 2023-07-26 | Stop reason: HOSPADM

## 2023-07-26 RX ORDER — BUPIVACAINE HYDROCHLORIDE 2.5 MG/ML
INJECTION, SOLUTION EPIDURAL; INFILTRATION; INTRACAUDAL AS NEEDED
Status: DISCONTINUED | OUTPATIENT
Start: 2023-07-26 | End: 2023-07-26 | Stop reason: HOSPADM

## 2023-07-26 RX ORDER — NALOXONE HCL 0.4 MG/ML
0.4 VIAL (ML) INJECTION AS NEEDED
Status: DISCONTINUED | OUTPATIENT
Start: 2023-07-26 | End: 2023-07-26 | Stop reason: HOSPADM

## 2023-07-26 RX ORDER — FENTANYL CITRATE 50 UG/ML
50 INJECTION, SOLUTION INTRAMUSCULAR; INTRAVENOUS
Status: DISCONTINUED | OUTPATIENT
Start: 2023-07-26 | End: 2023-07-26 | Stop reason: HOSPADM

## 2023-07-26 RX ORDER — GLYCOPYRROLATE 1 MG/5 ML
SYRINGE (ML) INTRAVENOUS AS NEEDED
Status: DISCONTINUED | OUTPATIENT
Start: 2023-07-26 | End: 2023-07-26 | Stop reason: SURG

## 2023-07-26 RX ORDER — PROMETHAZINE HYDROCHLORIDE 25 MG/1
25 TABLET ORAL ONCE AS NEEDED
Status: DISCONTINUED | OUTPATIENT
Start: 2023-07-26 | End: 2023-07-26 | Stop reason: HOSPADM

## 2023-07-26 RX ORDER — FENTANYL CITRATE 50 UG/ML
INJECTION, SOLUTION INTRAMUSCULAR; INTRAVENOUS AS NEEDED
Status: DISCONTINUED | OUTPATIENT
Start: 2023-07-26 | End: 2023-07-26 | Stop reason: SURG

## 2023-07-26 RX ORDER — EPHEDRINE SULFATE 5 MG/ML
5 INJECTION INTRAVENOUS ONCE AS NEEDED
Status: DISCONTINUED | OUTPATIENT
Start: 2023-07-26 | End: 2023-07-26 | Stop reason: HOSPADM

## 2023-07-26 RX ORDER — FLUMAZENIL 0.1 MG/ML
0.5 INJECTION INTRAVENOUS AS NEEDED
Status: DISCONTINUED | OUTPATIENT
Start: 2023-07-26 | End: 2023-07-26 | Stop reason: HOSPADM

## 2023-07-26 RX ORDER — PROMETHAZINE HYDROCHLORIDE 25 MG/1
25 SUPPOSITORY RECTAL ONCE AS NEEDED
Status: DISCONTINUED | OUTPATIENT
Start: 2023-07-26 | End: 2023-07-26 | Stop reason: HOSPADM

## 2023-07-26 RX ORDER — LABETALOL HYDROCHLORIDE 5 MG/ML
INJECTION, SOLUTION INTRAVENOUS AS NEEDED
Status: DISCONTINUED | OUTPATIENT
Start: 2023-07-26 | End: 2023-07-26 | Stop reason: SURG

## 2023-07-26 RX ORDER — ROCURONIUM BROMIDE 10 MG/ML
INJECTION, SOLUTION INTRAVENOUS AS NEEDED
Status: DISCONTINUED | OUTPATIENT
Start: 2023-07-26 | End: 2023-07-26 | Stop reason: SURG

## 2023-07-26 RX ORDER — ACETAMINOPHEN 325 MG/1
650 TABLET ORAL ONCE AS NEEDED
Status: DISCONTINUED | OUTPATIENT
Start: 2023-07-26 | End: 2023-07-26 | Stop reason: HOSPADM

## 2023-07-26 RX ORDER — LORAZEPAM 2 MG/ML
1 INJECTION INTRAMUSCULAR
Status: DISCONTINUED | OUTPATIENT
Start: 2023-07-26 | End: 2023-07-26 | Stop reason: HOSPADM

## 2023-07-26 RX ORDER — KETOROLAC TROMETHAMINE 30 MG/ML
30 INJECTION, SOLUTION INTRAMUSCULAR; INTRAVENOUS EVERY 6 HOURS PRN
Status: DISCONTINUED | OUTPATIENT
Start: 2023-07-26 | End: 2023-07-26

## 2023-07-26 RX ORDER — HYDRALAZINE HYDROCHLORIDE 20 MG/ML
5 INJECTION INTRAMUSCULAR; INTRAVENOUS
Status: DISCONTINUED | OUTPATIENT
Start: 2023-07-26 | End: 2023-07-26 | Stop reason: HOSPADM

## 2023-07-26 RX ORDER — SODIUM CHLORIDE, SODIUM LACTATE, POTASSIUM CHLORIDE, CALCIUM CHLORIDE 600; 310; 30; 20 MG/100ML; MG/100ML; MG/100ML; MG/100ML
1000 INJECTION, SOLUTION INTRAVENOUS CONTINUOUS
Status: DISCONTINUED | OUTPATIENT
Start: 2023-07-26 | End: 2023-07-27

## 2023-07-26 RX ORDER — SODIUM CHLORIDE, SODIUM LACTATE, POTASSIUM CHLORIDE, CALCIUM CHLORIDE 600; 310; 30; 20 MG/100ML; MG/100ML; MG/100ML; MG/100ML
INJECTION, SOLUTION INTRAVENOUS CONTINUOUS PRN
Status: DISCONTINUED | OUTPATIENT
Start: 2023-07-26 | End: 2023-07-26 | Stop reason: SURG

## 2023-07-26 RX ORDER — DIPHENHYDRAMINE HYDROCHLORIDE 50 MG/ML
12.5 INJECTION INTRAMUSCULAR; INTRAVENOUS ONCE AS NEEDED
Status: DISCONTINUED | OUTPATIENT
Start: 2023-07-26 | End: 2023-07-26 | Stop reason: HOSPADM

## 2023-07-26 RX ORDER — DIPHENHYDRAMINE HCL 25 MG
25 CAPSULE ORAL
Status: DISCONTINUED | OUTPATIENT
Start: 2023-07-26 | End: 2023-07-26 | Stop reason: HOSPADM

## 2023-07-26 RX ORDER — HYDROCODONE BITARTRATE AND ACETAMINOPHEN 10; 325 MG/1; MG/1
1 TABLET ORAL EVERY 4 HOURS PRN
Status: DISCONTINUED | OUTPATIENT
Start: 2023-07-26 | End: 2023-07-26 | Stop reason: HOSPADM

## 2023-07-26 RX ADMIN — Medication 10 ML: at 21:00

## 2023-07-26 RX ADMIN — HYDROMORPHONE HYDROCHLORIDE 0.5 MG: 1 INJECTION, SOLUTION INTRAMUSCULAR; INTRAVENOUS; SUBCUTANEOUS at 19:52

## 2023-07-26 RX ADMIN — SUGAMMADEX 200 MG: 100 INJECTION, SOLUTION INTRAVENOUS at 14:37

## 2023-07-26 RX ADMIN — LIDOCAINE HYDROCHLORIDE 60 MG: 20 INJECTION, SOLUTION EPIDURAL; INFILTRATION; INTRACAUDAL; PERINEURAL at 13:49

## 2023-07-26 RX ADMIN — HYDROMORPHONE HYDROCHLORIDE 0.5 MG: 1 INJECTION, SOLUTION INTRAMUSCULAR; INTRAVENOUS; SUBCUTANEOUS at 01:58

## 2023-07-26 RX ADMIN — EPHEDRINE SULFATE 5 MG: 5 INJECTION INTRAVENOUS at 13:57

## 2023-07-26 RX ADMIN — SODIUM CHLORIDE, SODIUM LACTATE, POTASSIUM CHLORIDE, AND CALCIUM CHLORIDE 1000 ML: .6; .31; .03; .02 INJECTION, SOLUTION INTRAVENOUS at 13:30

## 2023-07-26 RX ADMIN — DEXAMETHASONE SODIUM PHOSPHATE 4 MG: 4 INJECTION, SOLUTION INTRAMUSCULAR; INTRAVENOUS at 13:52

## 2023-07-26 RX ADMIN — HYDROMORPHONE HYDROCHLORIDE 0.5 MG: 1 INJECTION, SOLUTION INTRAMUSCULAR; INTRAVENOUS; SUBCUTANEOUS at 15:19

## 2023-07-26 RX ADMIN — LABETALOL 20 MG/4 ML (5 MG/ML) INTRAVENOUS SYRINGE 5 MG: at 14:10

## 2023-07-26 RX ADMIN — KETOROLAC TROMETHAMINE 30 MG: 30 INJECTION, SOLUTION INTRAMUSCULAR; INTRAVENOUS at 08:25

## 2023-07-26 RX ADMIN — HYDROMORPHONE HYDROCHLORIDE 0.5 MG: 1 INJECTION, SOLUTION INTRAMUSCULAR; INTRAVENOUS; SUBCUTANEOUS at 23:01

## 2023-07-26 RX ADMIN — SODIUM CHLORIDE, SODIUM LACTATE, POTASSIUM CHLORIDE, CALCIUM CHLORIDE AND DEXTROSE MONOHYDRATE 100 ML/HR: 5; 600; 310; 30; 20 INJECTION, SOLUTION INTRAVENOUS at 11:12

## 2023-07-26 RX ADMIN — HYDROMORPHONE HYDROCHLORIDE 0.5 MG: 1 INJECTION, SOLUTION INTRAMUSCULAR; INTRAVENOUS; SUBCUTANEOUS at 10:16

## 2023-07-26 RX ADMIN — FENTANYL CITRATE 50 MCG: 50 INJECTION, SOLUTION INTRAMUSCULAR; INTRAVENOUS at 15:37

## 2023-07-26 RX ADMIN — SODIUM CHLORIDE, SODIUM LACTATE, POTASSIUM CHLORIDE, AND CALCIUM CHLORIDE: .6; .31; .03; .02 INJECTION, SOLUTION INTRAVENOUS at 13:45

## 2023-07-26 RX ADMIN — FENTANYL CITRATE 100 MCG: 50 INJECTION, SOLUTION INTRAMUSCULAR; INTRAVENOUS at 14:05

## 2023-07-26 RX ADMIN — SODIUM CHLORIDE, SODIUM LACTATE, POTASSIUM CHLORIDE, CALCIUM CHLORIDE AND DEXTROSE MONOHYDRATE 100 ML/HR: 5; 600; 310; 30; 20 INJECTION, SOLUTION INTRAVENOUS at 23:01

## 2023-07-26 RX ADMIN — Medication 10 ML: at 08:26

## 2023-07-26 RX ADMIN — CEFAZOLIN 2000 MG: 2 INJECTION, POWDER, FOR SOLUTION INTRAMUSCULAR; INTRAVENOUS at 13:54

## 2023-07-26 RX ADMIN — Medication 0.1 MG: at 13:50

## 2023-07-26 RX ADMIN — SENNOSIDES AND DOCUSATE SODIUM 2 TABLET: 50; 8.6 TABLET ORAL at 20:17

## 2023-07-26 RX ADMIN — Medication 10 ML: at 08:25

## 2023-07-26 RX ADMIN — HYDROMORPHONE HYDROCHLORIDE 0.5 MG: 1 INJECTION, SOLUTION INTRAMUSCULAR; INTRAVENOUS; SUBCUTANEOUS at 15:58

## 2023-07-26 RX ADMIN — ROCURONIUM BROMIDE 50 MG: 10 INJECTION, SOLUTION INTRAVENOUS at 13:49

## 2023-07-26 NOTE — CONSULTS
"Nutrition Services    Patient Name: Marta Aguilar  YOB: 1970  MRN: [id number]  Admission date: 7/23/2023    Comment:    *See MSA data documented below    Severe acute illness related malnutrition related to decreased appetite in the setting of cholecystitis, as evidenced by insufficient energy intake of <50% of estimated energy requirement for >5 days, and moderate muscle and fat loss per NFPE.    When no longer NPO - order Boost Plus BID    PPE Documentation        PPE Worn By Provider gloves   PPE Worn By Patient  None     CLINICAL NUTRITION ASSESSMENT      Reason for Assessment 7/26: BMI 18     H&P      Past Medical History:   Diagnosis Date    CKD (chronic kidney disease)     Crohn disease        History reviewed. No pertinent surgical history.     Current Problems   Abdominal pain  - Hx of Crohn's colitis  - N/V  - HIDA scan showed no identified gallbladder activity; possible cystic duct obstruction or chronic gallbladder dysfunction; cholecystitis  - general surgery following  - GI following  - pt agreed to a cholecystectomy; planned for 7/26    CKD         Encounter Information        Trending Narrative     7/26: Visited pt to perform an NFPE due to low BMI of 18. Pt stated that her abdominal pain makes her feel \"like I am dying.\" She does not eat when she has this pain. The most recent abdominal pain began about 2-3 weeks ago. The pt did stat that her appetite is beginning to come back and she would really love a grilled cheese and tomato soup. The pt is NPO due to a cholecystectomy scheduled for today, 7/26. Once pt is no longer NPO, she is agreeable to chocolate Boost. The pt stated that her UBW is 100-110 lbs, and that at her lowest she was 85 lbs. Pt is currently 111 lbs. NFPE completed, consistent with nutrition diagnosis of malnutrition using AND/ASPEN criteria. See MSA below.        Anthropometrics        Current Height, Weight Height: 165.1 cm (65\")  Weight: 50.8 kg (111 lb 15.9 " oz) (07/25/23 0636)       Ideal Body Weight (IBW) 125 lbs   Usual Body Weight (UBW) 100-110 lbs - pt stated       Trending Weight Hx     This admission: 7/26: 111 lbs             PTA: Not enough weight history to track a trend.    Wt Readings from Last 30 Encounters:   07/25/23 0636 50.8 kg (111 lb 15.9 oz)   07/24/23 0149 47.6 kg (104 lb 15 oz)   07/23/23 1835 47.6 kg (105 lb)   06/20/23 1041 47.6 kg (105 lb)      BMI kg/m2 Body mass index is 18.64 kg/m².       Labs        Pertinent Labs    Results from last 7 days   Lab Units 07/25/23  0431 07/24/23  0509 07/23/23 2016   SODIUM mmol/L 137 137 142   POTASSIUM mmol/L 4.5 4.2 4.3   CHLORIDE mmol/L 107 105 109*   CO2 mmol/L 21.0* 22.0 19.0*   BUN mg/dL 14 15 20   CREATININE mg/dL 0.78 0.84 1.10*   CALCIUM mg/dL 8.8 9.0 10.1   BILIRUBIN mg/dL <0.2  --  0.2   ALK PHOS U/L 71  --  89   ALT (SGPT) U/L 33  --  18   AST (SGOT) U/L 23  --  20   GLUCOSE mg/dL 115* 84 109*     Results from last 7 days   Lab Units 07/25/23  0431 07/24/23  0509 07/23/23 2016   MAGNESIUM mg/dL  --   --  2.3   HEMOGLOBIN g/dL 14.1 12.9 15.9   HEMATOCRIT % 42.6 39.2 49.4*   TRIGLYCERIDES mg/dL  --  60  --      No results found for: COVID19  Lab Results   Component Value Date    HGBA1C 5.50 07/24/2023        Medications    Scheduled Medications senna-docusate sodium, 2 tablet, Oral, BID  sodium chloride, 10 mL, Intravenous, Q12H  sodium chloride, 10 mL, Intravenous, Q12H        Infusions sodium chloride, 100 mL/hr, Last Rate: 100 mL/hr (07/26/23 0513)        PRN Medications   acetaminophen    senna-docusate sodium **AND** polyethylene glycol **AND** bisacodyl **AND** bisacodyl    hydrALAZINE    HYDROcodone-acetaminophen    HYDROmorphone    ketorolac    ondansetron **OR** ondansetron    [COMPLETED] Insert Peripheral IV **AND** sodium chloride    sodium chloride    sodium chloride    sodium chloride     Physical Findings        Trending Physical   Appearance, NFPE 7/26: NFPE completed, consistent  with nutrition diagnosis of malnutrition using AND/ASPEN criteria. See MSA below.      --  Edema  None documented     Bowel Function Last documented BM: 7/23; pt has been NPO since admission and not eating prior to admission.     Tubes No feeding tubes     Chewing/Swallowing Pt denies difficulty     Skin No wounds documented     --  Current Nutrition Orders & Evaluation of Intake       Oral Nutrition     Food Allergies NKFA   Current PO Diet NPO Diet NPO Type: Sips with Meds, Ice Chips   Supplement None ordered   PO Evaluation     Trending % PO Intake 7/26: None NPO since admission   --  Nutritional Risk Screening        NRS-2002 Score          Nutrition Diagnosis         Nutrition Dx Problem 1 Severe acute illness related malnutrition related to decreased appetite in the setting of cholecystitis, as evidenced by insufficient energy intake of <50% of estimated energy requirement for >5 days, and moderate muscle and fat loss per NFPE.      Nutrition Dx Problem 2        Intervention Goal         Intervention Goal(s) Increase PO intake to a goal of >75% of meals     Nutrition Intervention        RD Action NFPE, order ONS when no longer NPO     Nutrition Prescription          Diet Prescription No change   Supplement Prescription No change   --  Monitor/Evaluation        Monitor PO intake, Supplement intake, Weight     Malnutrition Severity Assessment      Patient meets criteria for : Severe Malnutrition  Malnutrition Type (last 8 hours)       Malnutrition Severity Assessment       Row Name 07/26/23 0719       Malnutrition Severity Assessment    Malnutrition Type Acute Disease or Injury - Related Malnutrition      Row Name 07/26/23 0719       Insufficient Energy Intake     Insufficient Energy Intake Findings Severe    Insufficient Energy Intake  < or equal to 50% of est. energy requirement for > or equal to 5d)      Row Name 07/26/23 0719       Muscle Loss    Loss of Muscle Mass Findings Moderate    [place] Region  Moderate - slight depression    Clavicle Bone Region Moderate - some protrusion in females, visible in males    Acromion Bone Region Moderate - acromion may slightly protrude    Scapular Bone Region Moderate - mild depression, bones may show slightly    Dorsal Hand Region Moderate - slight depression    Patellar Region Moderate - patella more prominent, less muscle definition around patella    Anterior Thigh Region Moderate - mild depression on inner thigh    Posterior Calf Region Moderate - some roundness, slight firmness      Row Name 07/26/23 0719       Fat Loss    Subcutaneous Fat Loss Findings Moderate    Orbital Region  Moderate -  somewhat hollowness, slightly dark circles    Upper Arm Region Moderate - some fat tissue, not ample      Row Name 07/26/23 0719       Criteria Met (Must meet criteria for severity in at least 2 of these categories: M Wasting, Fat Loss, Fluid, Secondary Signs, Wt. Status, Intake)    Patient meets criteria for  Severe Malnutrition                           Electronically signed by:  Bonnie Cali RD  07/26/23 07:08 EDT

## 2023-07-26 NOTE — PLAN OF CARE
Goal Outcome Evaluation:  Plan of Care Reviewed With: patient        Progress: improving  Outcome Evaluation: Patient is to have lap minor today around 1600. Patient called out early AM about backpain stating 10 out of 10. This RN gave diludid 0.5 mg with little relief, patient was wanting a enema. care continues.

## 2023-07-26 NOTE — ANESTHESIA POSTPROCEDURE EVALUATION
Patient: Marta Aguilar    Procedure Summary       Date: 07/26/23 Room / Location: [facility] OR 06 / [facility] MAIN OR    Anesthesia Start: 1345 Anesthesia Stop: 1459    Procedure: CHOLECYSTECTOMY LAPAROSCOPIC (Abdomen) Diagnosis:       Acute cholecystitis      (Acute cholecystitis [K81.0])    Surgeons: Juve Hudson MD Provider: Jeremi Bautista MD    Anesthesia Type: general ASA Status: 2            Anesthesia Type: general    Vitals  Vitals Value Taken Time   /72 07/26/23 1643   Temp 97.4 °F (36.3 °C) 07/26/23 1613   Pulse 51 07/26/23 1646   Resp 12 07/26/23 1640   SpO2 90 % 07/26/23 1646   Vitals shown include unvalidated device data.        Post Anesthesia Care and Evaluation    Patient location during evaluation: PACU  Patient participation: complete - patient participated  Level of consciousness: awake  Pain scale: See nurse's notes for pain score.  Pain management: adequate    Airway patency: patent  Anesthetic complications: No anesthetic complications  PONV Status: none  Cardiovascular status: acceptable  Respiratory status: acceptable and spontaneous ventilation  Hydration status: acceptable    Comments: Patient seen and examined postoperatively; vital signs stable; SpO2 greater than or equal to 90%; cardiopulmonary status stable; nausea/vomiting adequately controlled; pain adequately controlled; no apparent anesthesia complications; patient discharged from anesthesia care when discharge criteria were met

## 2023-07-26 NOTE — ANESTHESIA PROCEDURE NOTES
Airway  Urgency: elective    Date/Time: 7/26/2023 1:50 PM  Airway not difficult    General Information and Staff    Patient location during procedure: OR  Anesthesiologist: Jeremi Bautista MD  CRNA/CAA: Noni Victoria CRNA    Indications and Patient Condition  Indications for airway management: airway protection    Preoxygenated: yes  Mask difficulty assessment: 1 - vent by mask    Final Airway Details  Final airway type: endotracheal airway      Successful airway: ETT  Cuffed: yes   Successful intubation technique: video laryngoscopy  Facilitating devices/methods: intubating stylet  Endotracheal tube insertion site: oral  Blade: Gonzalez  Blade size: 3  ETT size (mm): 7.0  Cormack-Lehane Classification: grade I - full view of glottis  Placement verified by: chest auscultation and capnometry   Measured from: lips  Assessment: lips, teeth, and gum same as pre-op and atraumatic intubation

## 2023-07-26 NOTE — CASE MANAGEMENT/SOCIAL WORK
Continued Stay Note  [facility]     Patient Name: Marta Aguilar  MRN: [id number]  Today's Date: 7/26/2023    Admit Date: 7/23/2023    Plan: DC plan: From a friend's house. Will return to friend's house. Friend will transport   Discharge Plan       Row Name 07/26/23 1056       Plan    Plan Comments Barrier: Today:CHOLECYSTECTOMY LAPAROSCOPIC. Ms. Aguilar will return home with a friend at discharge                      Phone communication or documentation only - no physical contact with patient or family.     Noni MATHUR,RN Case Manager  [facility]  Phone: Desk- [phone] cell- [phone]

## 2023-07-26 NOTE — OP NOTE
Operative Report:    Patient Name:  Marta Aguilar  YOB: 1970    Date of Surgery:  7/26/2023     Indications: 52-year-old lady with years of right upper quadrant abdominal pain history of Crohn's disease who on this admission was found to have evidence of acute or chronic cholecystitis with endon visualization of the gallbladder on HIDA scan.  I was asked to see her take out her gallbladder.  I counseled about the risk benefits of the operation she understood and wished to proceed.    Pre-op Diagnosis:   Acute cholecystitis [K81.0]       Post-Op Diagnosis Codes:     * Acute cholecystitis [K81.0]    Procedure/CPT® Codes:      Procedure(s):  CHOLECYSTECTOMY LAPAROSCOPIC    Staff:  Surgeon(s):  Juve Hudson MD    Circulator: Sahara Bennett RN  Scrub Person: Sandra Man  Assistant: Alee Kelly APRN  was responsible for performing the following activities: Retraction, Closing, and Held/Positioned Camera and their skilled assistance was necessary for the success of this case.        Anesthesia: General    Estimated Blood Loss: minimal    Implants:    Implant Name Type Inv. Item Serial No.  Lot No. LRB No. Used Action   CLIPAPPLR M/ ENDO LIGAMAX5 5MM 33CM MD/JOE - [id number] Implant CLIPAPPLR M/ ENDO LIGAMAX5 5MM 33CM MD/JOE  [employer]  DIV OF J AND J [id number] N/A 1 Implanted       Specimen:          Specimens       ID Source Type Tests Collected By Collected At Frozen?    A Gallbladder Tissue TISSUE PATHOLOGY EXAM   Juve Hudson MD 7/26/23 8357                 Findings: Rubbery gallbladder containing gallstones    Complications: None    Description of Procedure: Patient was taken the operating placed in the operative table in the supine position and general anesthesia.  Her abdomen was prepped and draped normal sterile fashion.  Timeout was performed identifying the patient procedure and site of operation.  I began the operation Mitran the abdomen through a left upper  quadrant 5 mm optical trocar entry point into the abdominal cavity abdomen was fully insufflated camera truce there is no evidence of injury  Structures.  She was placed in reverse Trendelenburg and right side up.  A periumbilical 12 mm port and 2 right subcostal 5 mm ports placed in laparoscopic guidance.  The gallbladder was grasped retractors right upper quadrant infundibulum tract laterally.  Perform a dissection the cystic duct and cystic artery using the hook Bovie the [place] dissector was able to get these 2 structures free and get the gallbladder base free of the liver edge revealed the critical view.  These tissues and the gallbladder had a rubbery texture.  I then doubly clipped and divided the cystic duct and cystic artery at the base the gallbladder free of the liver edge without any significant bile spillage or bleeding.  Was placed in Endo Catch bag and removed.  I introduced a Ray-Bola in the abdominal cavity to evacuate the small amount of fluid the right upper quadrant.  The dissection planes appeared hemostatic.  Clips were in good position.  The Ray-Bola was removed.  The ports were serially moved out the abdominal hemorrhage.  The 12 mm port site was closed with 0 Vicryl suture and suture passer.  Abdomen was desufflated skin was closed with 4-0 Vicryl suture and skin affix.  She tolerated procedure well's been transferred to recovery in satisfactory condition.      Juve Hudson MD     Date: 7/26/2023  Time: 14:51 EDT    This note was created using Dragon Voice Recognition software.

## 2023-07-26 NOTE — ANESTHESIA PREPROCEDURE EVALUATION
Anesthesia Evaluation     Patient summary reviewed and Nursing notes reviewed   no history of anesthetic complications:   NPO Solid Status: > 8 hours  NPO Liquid Status: > 8 hours           Airway   Mallampati: I  TM distance: >3 FB  Neck ROM: full  No difficulty expected  Dental - normal exam     Pulmonary - normal exam   (+) a smoker Current Abstained day of surgery,  Cardiovascular - normal exam    (+) hypertension poorly controlleddysrhythmias Bradycardia      Neuro/Psych- negative ROS  GI/Hepatic/Renal/Endo    (+) renal disease CRI    Musculoskeletal (-) negative ROS    Abdominal  - normal exam   Substance History - negative use     OB/GYN negative ob/gyn ROS         Other                        Anesthesia Plan    ASA 2     general     intravenous induction     Anesthetic plan, risks, benefits, and alternatives have been provided, discussed and informed consent has been obtained with: patient.    Plan discussed with CRNA.      CODE STATUS:    Level Of Support Discussed With: Patient  Code Status (Patient has no pulse and is not breathing): CPR (Attempt to Resuscitate)  Medical Interventions (Patient has pulse or is breathing): Full Support  Release to patient: Routine Release

## 2023-07-26 NOTE — PROGRESS NOTES
[facility] Medicine Services   Daily Progress Note    Patient Name: Marta Aguilar  : 1970  MRN: [id number]  Primary Care Physician:  Provider, No Known  Date of admission: 2023  Date and Time of Service: 2023 at 1058      Subjective      Chief Complaint: abdominal pain    Patient Reports is waiting for cholecystectomy. Thought she was having Chrons flare, abdomen feels okay today. Had mid and lower back pain.  No more vomitting    ROS as above      Objective      Vitals:   Temp:  [97.7 °F (36.5 °C)-98.3 °F (36.8 °C)] 98 °F (36.7 °C)  Heart Rate:  [50-80] 80  Resp:  [12-23] 16  BP: (119-145)/(63-82) 145/80    Physical Exam  Constitutional:       General: She is not in acute distress.  HENT:      Head: Normocephalic.      Mouth/Throat:      Mouth: Mucous membranes are moist.   Eyes:      Extraocular Movements: Extraocular movements intact.      Pupils: Pupils are equal, round, and reactive to light.   Cardiovascular:      Rate and Rhythm: Normal rate.   Pulmonary:      Effort: Pulmonary effort is normal.   Abdominal:      General: Abdomen is flat.      Palpations: Abdomen is soft.   Musculoskeletal:         General: No swelling or tenderness.      Cervical back: Normal range of motion and neck supple.   Skin:     General: Skin is warm and dry.   Neurological:      General: No focal deficit present.      Mental Status: She is alert and oriented to person, place, and time.   Psychiatric:         Mood and Affect: Mood normal.           Result Review    Result Review:  I have personally reviewed the results from the time of this admission to 2023 10:58 EDT and agree with these findings:  [x]  Laboratory  [x]  Microbiology  [x]  Radiology  []  EKG/Telemetry   []  Cardiology/Vascular   []  Pathology  []  Old records  []  Other:  Most notable findings include: HIDA scan noted          Assessment & Plan      Brief Patient Summary:  Marta Aguilar is a 52 y.o. female who is  admitted for abdominal pain, plan for cholecystectomy      senna-docusate sodium, 2 tablet, Oral, BID  sodium chloride, 10 mL, Intravenous, Q12H  sodium chloride, 10 mL, Intravenous, Q12H       sodium chloride, 100 mL/hr, Last Rate: Stopped (07/26/23 0842)         Active Hospital Problems:  Active Hospital Problems    Diagnosis     **Abdominal pain     Acute cholecystitis      Plan:   Abdominal pain  Acute cholecystitis    Plan for cholecystectomy today  Started on IV fluids    DVT prophylaxis:  Mechanical DVT prophylaxis orders are present.    CODE STATUS:    Level Of Support Discussed With: Patient  Code Status (Patient has no pulse and is not breathing): CPR (Attempt to Resuscitate)  Medical Interventions (Patient has pulse or is breathing): Full Support  Release to patient: Routine Release      Disposition:  I expect patient to be discharged tomorrow.          Electronically signed by Lakeisha Ray MD, 07/26/23, 10:58 EDT.  [facility] Hospitalist Team

## 2023-07-27 VITALS
HEART RATE: 86 BPM | SYSTOLIC BLOOD PRESSURE: 144 MMHG | RESPIRATION RATE: 18 BRPM | DIASTOLIC BLOOD PRESSURE: 88 MMHG | HEIGHT: 65 IN | WEIGHT: 111.99 LBS | OXYGEN SATURATION: 95 % | BODY MASS INDEX: 18.66 KG/M2 | TEMPERATURE: 98.2 F

## 2023-07-27 PROBLEM — E43 SEVERE MALNUTRITION: Status: ACTIVE | Noted: 2023-07-27

## 2023-07-27 LAB
ANION GAP SERPL CALCULATED.3IONS-SCNC: 9 MMOL/L (ref 5–15)
BUN SERPL-MCNC: 14 MG/DL (ref 6–20)
BUN/CREAT SERPL: 17.3 (ref 7–25)
CALCIUM SPEC-SCNC: 9.1 MG/DL (ref 8.6–10.5)
CHLORIDE SERPL-SCNC: 100 MMOL/L (ref 98–107)
CO2 SERPL-SCNC: 26 MMOL/L (ref 22–29)
CREAT SERPL-MCNC: 0.81 MG/DL (ref 0.57–1)
EGFRCR SERPLBLD CKD-EPI 2021: 86.4 ML/MIN/1.73
GLUCOSE SERPL-MCNC: 104 MG/DL (ref 65–99)
POTASSIUM SERPL-SCNC: 3.7 MMOL/L (ref 3.5–5.2)
SODIUM SERPL-SCNC: 135 MMOL/L (ref 136–145)

## 2023-07-27 PROCEDURE — 99024 POSTOP FOLLOW-UP VISIT: CPT | Performed by: SURGERY

## 2023-07-27 PROCEDURE — 25010000002 HYDROMORPHONE 1 MG/ML SOLUTION: Performed by: SURGERY

## 2023-07-27 RX ORDER — HYDROCODONE BITARTRATE AND ACETAMINOPHEN 5; 325 MG/1; MG/1
1 TABLET ORAL EVERY 6 HOURS PRN
Qty: 12 TABLET | Refills: 0 | Status: SHIPPED | OUTPATIENT
Start: 2023-07-27 | End: 2023-07-30

## 2023-07-27 RX ORDER — ACETAMINOPHEN 325 MG/1
650 TABLET ORAL EVERY 4 HOURS PRN
Start: 2023-07-27

## 2023-07-27 RX ADMIN — HYDROCODONE BITARTRATE AND ACETAMINOPHEN 1 TABLET: 7.5; 325 TABLET ORAL at 01:42

## 2023-07-27 RX ADMIN — HYDROCODONE BITARTRATE AND ACETAMINOPHEN 1 TABLET: 7.5; 325 TABLET ORAL at 15:16

## 2023-07-27 RX ADMIN — SENNOSIDES AND DOCUSATE SODIUM 2 TABLET: 50; 8.6 TABLET ORAL at 09:13

## 2023-07-27 RX ADMIN — HYDROCODONE BITARTRATE AND ACETAMINOPHEN 1 TABLET: 7.5; 325 TABLET ORAL at 09:13

## 2023-07-27 RX ADMIN — HYDROMORPHONE HYDROCHLORIDE 0.5 MG: 1 INJECTION, SOLUTION INTRAMUSCULAR; INTRAVENOUS; SUBCUTANEOUS at 07:02

## 2023-07-27 NOTE — PLAN OF CARE
Goal Outcome Evaluation:      C/o pain on incision site, medicated per MAR. Hasn't passed any flatus yet, bowel sounds present. Call light is within reach and is able to make her needs known.

## 2023-07-27 NOTE — PROGRESS NOTES
General Surgery Progress Note    Name: Marta Aguilar ADMIT: 2023   : 1970  PCP: Provider, No Known    MRN: [id number] LOS: 0 days   AGE/SEX: 52 y.o. female  ROOM: [address] [place] [address]    Chief Complaint   Patient presents with    Abdominal Pain     Subjective     52 y.o. female status post laparoscopic cholecystectomy for acute cholecystitis.    In general seems to be doing okay having some moderate right-sided abdominal pain.  No severe nausea or vomiting.  Has been ambulatory.    Objective     Scheduled Medications:   enoxaparin, 40 mg, Subcutaneous, Q24H  senna-docusate sodium, 2 tablet, Oral, BID  sodium chloride, 10 mL, Intravenous, Q12H  sodium chloride, 10 mL, Intravenous, Q12H        Active Infusions:       As Needed Medications:    acetaminophen    senna-docusate sodium **AND** polyethylene glycol **AND** bisacodyl **AND** bisacodyl    hydrALAZINE    HYDROcodone-acetaminophen    HYDROmorphone    ondansetron **OR** ondansetron    [COMPLETED] Insert Peripheral IV **AND** sodium chloride    sodium chloride    sodium chloride    sodium chloride    Vital Signs  Vital Signs Patient Vitals for the past 24 hrs:   BP Temp Temp src Pulse Resp SpO2   23 1214 144/88 98.2 °F (36.8 °C) Oral 86 18 95 %   23 0830 141/80 98 °F (36.7 °C) Oral 60 18 96 %   23 0530 146/91 98.2 °F (36.8 °C) Oral 66 16 97 %   23 0124 133/76 98.6 °F (37 °C) Oral 65 15 92 %   23 151/88 98 °F (36.7 °C) Oral 56 17 97 %   23 1759 147/78 97.4 °F (36.3 °C) Oral (!) 47 12 99 %   23 1720 142/73 97.4 °F (36.3 °C) Oral 54 9 92 %   23 1640 140/80 -- -- 52 12 91 %   23 1635 125/78 -- -- 52 -- 91 %   23 1630 138/75 -- -- 71 -- 98 %       Physical Exam:  Physical Exam  Constitutional:       Appearance: Normal appearance.   HENT:      Head: Normocephalic and atraumatic.   Pulmonary:      Effort: Pulmonary effort is normal.   Abdominal:      Palpations: Abdomen is soft.       Comments: Appropriately tender to palpation   Neurological:      Mental Status: She is alert.       Results Review:     CBC    Results from last 7 days   Lab Units 07/26/23 2307 07/26/23  1529 07/25/23  0431 07/24/23  0509 07/23/23 2016   WBC 10*3/mm3 12.30* 9.50 8.60 11.20* 10.90*   HEMOGLOBIN g/dL 14.1 13.7 14.1 12.9 15.9   PLATELETS 10*3/mm3 290 241 265 277 250     BMP   Results from last 7 days   Lab Units 07/26/23 2307 07/26/23  1529 07/25/23  0431 07/24/23  0509 07/23/23 2016   SODIUM mmol/L 135* 137 137 137 142   POTASSIUM mmol/L 3.7 3.4* 4.5 4.2 4.3   CHLORIDE mmol/L 100 105 107 105 109*   CO2 mmol/L 26.0 23.0 21.0* 22.0 19.0*   BUN mg/dL 14 12 14 15 20   CREATININE mg/dL 0.81 0.82 0.78 0.84 1.10*   GLUCOSE mg/dL 104* 83 115* 84 109*   MAGNESIUM mg/dL  --   --   --   --  2.3       I reviewed the patient's new clinical results.    Assessment & Plan       Abdominal pain    Acute cholecystitis    Severe malnutrition      52 y.o. female postop day 1 laparoscopic cholecystectomy.    Looks okay from my standpoint.  Can follow-up in the office in 2 weeks  Diet as tolerated  Lift more than 10 to 15 pounds for 2-week  I recommend about 10 Norco 5 mg tablet for discharge  Okay to shower in 24 hours no tub soaking        This note was created using Dragon Voice Recognition software.    Juve Hudson MD  07/27/23  16:24 EDT

## 2023-07-27 NOTE — CASE MANAGEMENT/SOCIAL WORK
Case Management Discharge Note      Final Note: HOME         Selected Continued Care - Discharged on 7/27/2023 Admission date: 7/23/2023 - Discharge disposition: Home or Self Care                  Transportation Services  Private: Car    Final Discharge Disposition Code: 01 - home or self-care

## 2023-07-27 NOTE — DISCHARGE SUMMARY
[facility] Medicine Services  Discharge Summary    Date of Service: 23  Patient Name: Marta Aguilar  : 1970  MRN: [id number]    Date of Admission: 2023  Discharge Diagnosis: Acute calculous cholecystitis  Date of Discharge:  23  Primary Care Physician: Provider, No Known      Presenting Problem:   Abdominal pain [R10.9]  Generalized abdominal pain [R10.84]  Nausea and vomiting, unspecified vomiting type [R11.2]    Active and Resolved Hospital Problems:  Active Hospital Problems    Diagnosis POA    **Abdominal pain [R10.9] Yes    Severe malnutrition [E43] Yes    Acute cholecystitis [K81.0] Yes      Resolved Hospital Problems   No resolved problems to display.         Hospital Course     52-year-old female with history of Crohn's disease and cocaine use who presented with sudden nausea, vomiting and right upper quadrant/epigastric pain and was found to have acute cholecystitis. She is status post laparoscopic cholecystectomy on . Postop course appears uneventful except for mild operative pain     Acute calculus cholecystitis  -Status post laparoscopic cholecystectomy on   -Diet resumed  -Norco as needed for 3 days #12 tablets     Mild hypokalemia, not clinically significant, resolved    Hx of Crohn's disease, diagnosed in  but lost to follow    Severe acute illness related malnutrition related to decreased appetite in the setting of cholecystitis, as evidenced by insufficient energy intake of <50% of estimated energy requirement for >5 days, and moderate muscle and fat loss per NFPE.     Urine drug screen positive for cocaine    Day of Discharge     Vital Signs:  Temp:  [97.4 °F (36.3 °C)-98.6 °F (37 °C)] 98.2 °F (36.8 °C)  Heart Rate:  [47-86] 86  Resp:  [8-18] 18  BP: (125-152)/(71-91) 144/88    Physical Exam:  General appearance: Normal interaction, not in distress  Mental status: Alert and oriented  CVS: Regular heart sounds, no gallops  Respiration:  Unlabored breathing, clear to auscultation  GI: Soft, nondistended, mild operative tenderness  Skin: Normal color and temperature  Extremity: No leg edema, no calf tenderness  Musculoskeletal: Skinny build, well-developed female  Psychiatry: Normal affect, appropriate behavior  Neurology: Moves all extremities, normal speech, no facial droop         Pertinent  and/or Most Recent Results     LAB RESULTS:      Lab 07/26/23 2307 07/26/23  1529 07/25/23 0431 07/24/23 0509 07/23/23 2016   WBC 12.30* 9.50 8.60 11.20* 10.90*   HEMOGLOBIN 14.1 13.7 14.1 12.9 15.9   HEMATOCRIT 42.7 41.6 42.6 39.2 49.4*   PLATELETS 290 241 265 277 250   NEUTROS ABS 8.70* 5.40 7.20* 7.40* 8.90*   LYMPHS ABS 2.90 3.30* 1.20 2.60 1.60   MONOS ABS 0.60 0.60 0.10 1.10* 0.40   EOS ABS 0.10 0.10 0.00 0.00 0.00   MCV 93.9 94.8 95.2 93.5 97.2*   SED RATE  --   --   --  11  --    CRP  --   --   --  <0.30  --          Lab 07/26/23 2307 07/26/23 1529 07/25/23 0431 07/24/23 0509 07/23/23 2016   SODIUM 135* 137 137 137 142   POTASSIUM 3.7 3.4* 4.5 4.2 4.3   CHLORIDE 100 105 107 105 109*   CO2 26.0 23.0 21.0* 22.0 19.0*   ANION GAP 9.0 9.0 9.0 10.0 14.0   BUN 14 12 14 15 20   CREATININE 0.81 0.82 0.78 0.84 1.10*   EGFR 86.4 85.1 90.4 82.7 59.8*   GLUCOSE 104* 83 115* 84 109*   CALCIUM 9.1 8.3* 8.8 9.0 10.1   MAGNESIUM  --   --   --   --  2.3   HEMOGLOBIN A1C  --   --   --  5.50  --          Lab 07/25/23 0431 07/24/23 0509 07/23/23 2016   TOTAL PROTEIN 6.0  --  7.7   ALBUMIN 3.6  --  4.6   GLOBULIN 2.4  --  3.1   ALT (SGPT) 33  --  18   AST (SGOT) 23  --  20   BILIRUBIN <0.2  --  0.2   ALK PHOS 71  --  89   AMYLASE  --  300*  --    LIPASE  --   --  29             Lab 07/24/23  0509   CHOLESTEROL 152   LDL CHOL 67   HDL CHOL 73*   TRIGLYCERIDES 60             Brief Urine Lab Results  (Last result in the past 365 days)        Color   Clarity   Blood   Leuk Est   Nitrite   Protein   CREAT   Urine HCG        07/23/23 2319 Yellow   Clear   Trace    Negative   Negative   Negative                 Microbiology Results (last 10 days)       ** No results found for the last 240 hours. **            CT Abdomen Pelvis With Contrast    Result Date: 7/23/2023  Impression: Impression: 1. Mild gallbladder wall thickening seen near the fundus which appears similar as compared to the previous study. No evidence of stones or biliary ductal dilatation. Otherwise, no acute intra-abdominal or intrapelvic process. 2. Ancillary findings as described above. Electronically Signed: Amber Ruby  7/23/2023 9:29 PM EDT  Workstation ID: [id number]    NM HIDA SCAN WITH PHARMACOLOGICAL INTERVENTION    Result Date: 7/25/2023  Impression: Impression: 1. Patent common bile duct. 2. Gallbladder activity is not definitely identified during the 2-hour course of the exam, suspicious for cystic duct obstruction or chronic gallbladder dysfunction. Electronically Signed: Britton Mabry  7/25/2023 9:10 AM EDT  Workstation ID: [id number]                 Labs Pending at Discharge:  Pending Labs       Order Current Status    Tissue Pathology Exam In process            Procedures Performed  Procedure(s):  CHOLECYSTECTOMY LAPAROSCOPIC         Consults:   Consults       Date and Time Order Name Status Description    7/25/2023  2:39 PM Inpatient Hospitalist Consult Completed     7/25/2023 10:43 AM Inpatient General Surgery Consult      7/24/2023  1:59 AM Inpatient Gastroenterology Consult Completed               Discharge Details        Discharge Medications        New Medications        Instructions Start Date   acetaminophen 325 MG tablet  Commonly known as: TYLENOL   650 mg, Oral, Every 4 Hours PRN      HYDROcodone-acetaminophen 5-325 MG per tablet  Commonly known as: Norco   1 tablet, Oral, Every 6 Hours PRN               No Known Allergies      Discharge Disposition:   Home or Self Care    Diet:  Hospital:  Diet Order   Procedures    Diet: Regular/House Diet; Texture: Regular Texture (IDDSI 7); Fluid  Consistency: Thin (IDDSI 0)         Discharge Activity:   Activity Instructions       Activity as Tolerated                CODE STATUS:  Code Status and Medical Interventions:   Ordered at: 07/24/23 0639     Level Of Support Discussed With:    Patient     Code Status (Patient has no pulse and is not breathing):    CPR (Attempt to Resuscitate)     Medical Interventions (Patient has pulse or is breathing):    Full Support     Release to patient:    Routine Release         No future appointments.        Time spent on Discharge including face to face service:  35 minutes    Signature: Electronically signed by Sana Murguia MD, 07/27/23, 15:45 EDT.  [facility] Hospitalist Team

## 2023-07-27 NOTE — PLAN OF CARE
Goal Outcome Evaluation:              Outcome Evaluation: pt doing well with ambulation and diet toleration, will dischrge home today

## 2023-07-28 ENCOUNTER — TELEPHONE (OUTPATIENT)
Dept: SURGERY | Facility: CLINIC | Age: 53
End: 2023-07-28

## 2023-07-28 LAB
LAB AP CASE REPORT: NORMAL
PATH REPORT.FINAL DX SPEC: NORMAL
PATH REPORT.GROSS SPEC: NORMAL

## 2023-08-22 ENCOUNTER — OFFICE VISIT (OUTPATIENT)
Dept: SURGERY | Facility: CLINIC | Age: 53
End: 2023-08-22

## 2023-08-22 VITALS
OXYGEN SATURATION: 99 % | WEIGHT: 102 LBS | SYSTOLIC BLOOD PRESSURE: 132 MMHG | HEART RATE: 87 BPM | RESPIRATION RATE: 16 BRPM | TEMPERATURE: 98 F | DIASTOLIC BLOOD PRESSURE: 84 MMHG | HEIGHT: 65 IN | BODY MASS INDEX: 17 KG/M2

## 2023-08-22 DIAGNOSIS — K81.0 ACUTE CHOLECYSTITIS: Primary | ICD-10-CM

## 2023-08-22 PROCEDURE — 99024 POSTOP FOLLOW-UP VISIT: CPT | Performed by: SURGERY

## 2023-08-22 NOTE — PROGRESS NOTES
"Subjective   Marta Aguilar is a 52 y.o. female.   Status post laparoscopic cholecystectomy for acute cholecystitis.  In general she says that she feels so much better.  Her upper abdominal symptoms were very severe have basically resolved.  She still has some mild to moderate symptoms.  He is able to tolerate a diet she has a bowel movement about every day which used to be about once every week or so.  No incisional concerns.    Objective   /84 (BP Location: Left arm, Patient Position: Sitting, Cuff Size: Adult)   Pulse 87   Temp 98 øF (36.7 øC) (Infrared)   Resp 16   Ht 165.1 cm (65\")   Wt 46.3 kg (102 lb)   SpO2 99%   BMI 16.97 kg/mý   Physical Exam  Diminished soft mildly distended mild tenderness to palpation around the incisions but there is no evidence of infection or hernia.  Assessment & Plan   Diagnoses and all orders for this visit:    1. Acute cholecystitis (Primary)    Pathology reviewed consistent with chronic calculus cholecystitis.    Increase activity as tolerated.  Diet as tolerated from my standpoint.  Follow-up as needed with me.  Will need ongoing follow-up with her general medical providers and gastroenterology.    Juve Hudson MD  8/22/2023  10:12 AM EDT    This note was created using Dragon Voice Recognition software.  "

## 2024-07-25 ENCOUNTER — HOSPITAL ENCOUNTER (EMERGENCY)
Facility: HOSPITAL | Age: 54
Discharge: LEFT BEFORE DISCHARGE | End: 2024-07-25
Attending: EMERGENCY MEDICINE
Payer: MEDICAID

## 2024-07-25 VITALS
WEIGHT: 105.82 LBS | DIASTOLIC BLOOD PRESSURE: 82 MMHG | SYSTOLIC BLOOD PRESSURE: 107 MMHG | RESPIRATION RATE: 18 BRPM | BODY MASS INDEX: 17.63 KG/M2 | TEMPERATURE: 97.5 F | HEIGHT: 65 IN | HEART RATE: 59 BPM | OXYGEN SATURATION: 98 %

## 2024-07-25 PROCEDURE — 99211 OFF/OP EST MAY X REQ PHY/QHP: CPT

## 2024-07-25 NOTE — ED NOTES
Patient decided after being hooked up to monitor that she would like to leave. She was educated on why she should stay and all risk factors of leaving. She states that she will be back at a later time she has an appointment at 11:30 that she cannot miss.

## 2025-07-06 ENCOUNTER — APPOINTMENT (OUTPATIENT)
Dept: GENERAL RADIOLOGY | Facility: HOSPITAL | Age: 55
End: 2025-07-06
Payer: OTHER GOVERNMENT

## 2025-07-06 ENCOUNTER — APPOINTMENT (OUTPATIENT)
Dept: CT IMAGING | Facility: HOSPITAL | Age: 55
End: 2025-07-06
Payer: OTHER GOVERNMENT

## 2025-07-06 ENCOUNTER — HOSPITAL ENCOUNTER (EMERGENCY)
Facility: HOSPITAL | Age: 55
Discharge: HOME | End: 2025-07-06
Attending: EMERGENCY MEDICINE | Admitting: EMERGENCY MEDICINE
Payer: OTHER GOVERNMENT

## 2025-07-06 VITALS
SYSTOLIC BLOOD PRESSURE: 161 MMHG | DIASTOLIC BLOOD PRESSURE: 71 MMHG | WEIGHT: 103.62 LBS | OXYGEN SATURATION: 96 % | HEART RATE: 52 BPM | TEMPERATURE: 98.3 F | HEIGHT: 65 IN | BODY MASS INDEX: 17.26 KG/M2 | RESPIRATION RATE: 20 BRPM

## 2025-07-06 DIAGNOSIS — R42 DIZZINESS: Primary | ICD-10-CM

## 2025-07-06 DIAGNOSIS — S00.83XA CONTUSION OF FOREHEAD, INITIAL ENCOUNTER: ICD-10-CM

## 2025-07-06 DIAGNOSIS — S62.325A DISPLACED FRACTURE OF SHAFT OF FOURTH METACARPAL BONE, LEFT HAND, INITIAL ENCOUNTER FOR CLOSED FRACTURE: ICD-10-CM

## 2025-07-06 LAB
ALBUMIN SERPL-MCNC: 4.2 G/DL (ref 3.5–5.2)
ALBUMIN/GLOB SERPL: 1.4 G/DL
ALP SERPL-CCNC: 82 U/L (ref 39–117)
ALT SERPL W P-5'-P-CCNC: 14 U/L (ref 1–33)
ANION GAP SERPL CALCULATED.3IONS-SCNC: 9.8 MMOL/L (ref 5–15)
AST SERPL-CCNC: 21 U/L (ref 1–32)
BASOPHILS # BLD AUTO: 0.03 10*3/MM3 (ref 0–0.2)
BASOPHILS NFR BLD AUTO: 0.4 % (ref 0–1.5)
BILIRUB SERPL-MCNC: <0.2 MG/DL (ref 0–1.2)
BUN SERPL-MCNC: 13.1 MG/DL (ref 6–20)
BUN/CREAT SERPL: 11.3 (ref 7–25)
CALCIUM SPEC-SCNC: 9.6 MG/DL (ref 8.6–10.5)
CHLORIDE SERPL-SCNC: 108 MMOL/L (ref 98–107)
CO2 SERPL-SCNC: 25.2 MMOL/L (ref 22–29)
CREAT SERPL-MCNC: 1.16 MG/DL (ref 0.57–1)
DEPRECATED RDW RBC AUTO: 45.7 FL (ref 37–54)
EGFRCR SERPLBLD CKD-EPI 2021: 55.4 ML/MIN/1.73
EOSINOPHIL # BLD AUTO: 0.23 10*3/MM3 (ref 0–0.4)
EOSINOPHIL NFR BLD AUTO: 2.8 % (ref 0.3–6.2)
ERYTHROCYTE [DISTWIDTH] IN BLOOD BY AUTOMATED COUNT: 12.8 % (ref 12.3–15.4)
GLOBULIN UR ELPH-MCNC: 3 GM/DL
GLUCOSE SERPL-MCNC: 106 MG/DL (ref 65–99)
HCT VFR BLD AUTO: 49.5 % (ref 34–46.6)
HGB BLD-MCNC: 15.6 G/DL (ref 12–15.9)
IMM GRANULOCYTES # BLD AUTO: 0.02 10*3/MM3 (ref 0–0.05)
IMM GRANULOCYTES NFR BLD AUTO: 0.2 % (ref 0–0.5)
LYMPHOCYTES # BLD AUTO: 2.36 10*3/MM3 (ref 0.7–3.1)
LYMPHOCYTES NFR BLD AUTO: 28.7 % (ref 19.6–45.3)
MAGNESIUM SERPL-MCNC: 2.1 MG/DL (ref 1.6–2.6)
MCH RBC QN AUTO: 30.2 PG (ref 26.6–33)
MCHC RBC AUTO-ENTMCNC: 31.5 G/DL (ref 31.5–35.7)
MCV RBC AUTO: 95.9 FL (ref 79–97)
MONOCYTES # BLD AUTO: 0.55 10*3/MM3 (ref 0.1–0.9)
MONOCYTES NFR BLD AUTO: 6.7 % (ref 5–12)
NEUTROPHILS NFR BLD AUTO: 5.02 10*3/MM3 (ref 1.7–7)
NEUTROPHILS NFR BLD AUTO: 61.2 % (ref 42.7–76)
NRBC BLD AUTO-RTO: 0 /100 WBC (ref 0–0.2)
PLATELET # BLD AUTO: 346 10*3/MM3 (ref 140–450)
PMV BLD AUTO: 9.4 FL (ref 6–12)
POTASSIUM SERPL-SCNC: 4.4 MMOL/L (ref 3.5–5.2)
PROT SERPL-MCNC: 7.2 G/DL (ref 6–8.5)
RBC # BLD AUTO: 5.16 10*6/MM3 (ref 3.77–5.28)
SODIUM SERPL-SCNC: 143 MMOL/L (ref 136–145)
TSH SERPL DL<=0.05 MIU/L-ACNC: 1.21 UIU/ML (ref 0.27–4.2)
WBC NRBC COR # BLD AUTO: 8.21 10*3/MM3 (ref 3.4–10.8)

## 2025-07-06 PROCEDURE — 80053 COMPREHEN METABOLIC PANEL: CPT | Performed by: EMERGENCY MEDICINE

## 2025-07-06 PROCEDURE — 85025 COMPLETE CBC W/AUTO DIFF WBC: CPT | Performed by: EMERGENCY MEDICINE

## 2025-07-06 PROCEDURE — 83735 ASSAY OF MAGNESIUM: CPT | Performed by: EMERGENCY MEDICINE

## 2025-07-06 PROCEDURE — 73130 X-RAY EXAM OF HAND: CPT

## 2025-07-06 PROCEDURE — 99284 EMERGENCY DEPT VISIT MOD MDM: CPT

## 2025-07-06 PROCEDURE — 36415 COLL VENOUS BLD VENIPUNCTURE: CPT

## 2025-07-06 PROCEDURE — 70450 CT HEAD/BRAIN W/O DYE: CPT

## 2025-07-06 PROCEDURE — 84443 ASSAY THYROID STIM HORMONE: CPT | Performed by: EMERGENCY MEDICINE

## 2025-07-06 RX ORDER — MECLIZINE HYDROCHLORIDE 25 MG/1
25 TABLET ORAL 3 TIMES DAILY PRN
Qty: 15 TABLET | Refills: 0 | Status: SHIPPED | OUTPATIENT
Start: 2025-07-06

## 2025-07-06 RX ORDER — HYDROCODONE BITARTRATE AND ACETAMINOPHEN 5; 325 MG/1; MG/1
1 TABLET ORAL EVERY 6 HOURS PRN
Qty: 15 TABLET | Refills: 0 | Status: SHIPPED | OUTPATIENT
Start: 2025-07-06

## 2025-07-06 NOTE — ED PROVIDER NOTES
Subjective   History of Present Illness  54-year-old female complaining of pain to her left hand.  She states she fell last night and had immediate pain and later swelling.  She states she heard a crack.  She states that she fell 2 days ago and hit her forehead has had persistent headache since then along with some photophobia.  The patient reports that she has had no neck pain or stiffness.  She reports no decrease in sensation or circulation.  She states she feels weak and lightheaded and has been told in the past that she may be anemic she reports no melena hematemesis medic easier.  She denies dysuria hematuria      Review of Systems   Gastrointestinal:  Negative for anal bleeding and blood in stool.   Genitourinary:  Negative for dysuria.   Musculoskeletal:  Positive for joint swelling. Negative for back pain and neck pain.   Neurological:  Positive for headaches.   All other systems reviewed and are negative.      Past Medical History:   Diagnosis Date    CKD (chronic kidney disease)     Crohn disease        No Known Allergies    Past Surgical History:   Procedure Laterality Date    CHOLECYSTECTOMY N/A 7/26/2023    Procedure: CHOLECYSTECTOMY LAPAROSCOPIC;  Surgeon: Juve Hudson MD;  Location: [facility];  Service: General;  Laterality: N/A;       No family history on file.    Social History     Socioeconomic History    Marital status: Legally    Tobacco Use    Smoking status: Every Day     Current packs/day: 0.25     Average packs/day: 0.3 packs/day for 15.0 years (3.8 ttl pk-yrs)     Types: Cigarettes     Passive exposure: Current    Smokeless tobacco: Never   Vaping Use    Vaping status: Never Used   Substance and Sexual Activity    Alcohol use: Never    Drug use: Yes     Types: Cocaine(coke)    Sexual activity: Never     No reported safety issues      Objective   Physical Exam  Alert Sona Coma Scale 15   HEENT: Pupils equal and reactive to light. Conjunctivae are not injected. Normal  tympanic membranes. Oropharynx and nares are normal.  Contusion is noted to the right aspect of the forehead there is no palpable fracture or step-off extraocular movements are intact without diplopia there is no hyphema   Neck: Supple. Midline trachea. No JVD. No goiter.   Chest: Clear and equal breath sounds bilaterally, regular rate and rhythm without murmur or rub.   Abdomen: Positive bowel sounds, nontender, nondistended. No rebound or peritoneal signs. No CVA tenderness.   Extremities no clubbing. cyanosis or edema. Motor sensory exam is normal. The full range of motion is intact there is contusion and swelling to the metacarpal area just lateral of midline in the left hand.  The patient has a full range of motion intact distally including flexor digitorum profundus cap refill is normal.  The patient is   Skin: Warm and dry, no rashes or petechia.   Lymphatic: No regional lymphadenopathy. No calf pain, swelling or Homans sign    Procedures       After timeout and consent the patient was placed in an ulnar gutter splint.  She was neurovascularly intact afterward    ED Course      Labs Reviewed   COMPREHENSIVE METABOLIC PANEL - Abnormal; Notable for the following components:       Result Value    Glucose 106 (*)     Creatinine 1.16 (*)     Chloride 108 (*)     eGFR 55.4 (*)     All other components within normal limits    Narrative:     GFR Categories in Chronic Kidney Disease (CKD)              GFR Category          GFR (mL/min/1.73)    Interpretation  G1                    90 or greater        Normal or high (1)  G2                    60-89                Mild decrease (1)  G3a                   45-59                Mild to moderate decrease  G3b                   30-44                Moderate to severe decrease  G4                    15-29                Severe decrease  G5                    14 or less           Kidney failure    (1)In the absence of evidence of kidney disease, neither GFR category G1 or G2  fulfill the criteria for CKD.    eGFR calculation 2021 CKD-EPI creatinine equation, which does not include race as a factor   CBC WITH AUTO DIFFERENTIAL - Abnormal; Notable for the following components:    Hematocrit 49.5 (*)     All other components within normal limits   MAGNESIUM - Normal   TSH RFX ON ABNORMAL TO FREE T4 - Normal   CBC AND DIFFERENTIAL    Narrative:     The following orders were created for panel order CBC & Differential.  Procedure                               Abnormality         Status                     ---------                               -----------         ------                     CBC Auto Differential[[id number]]        Abnormal            Final result                 Please view results for these tests on the individual orders.     Medications - No data to display  CT Head Without Contrast  Result Date: 7/6/2025  Impression: Normal noncontrast head CT. Electronically Signed: Earnest Parra MD  7/6/2025 1:04 PM EDT  Workstation ID: [id number]    XR Hand 3+ View Left  Result Date: 7/6/2025  Impression: Mildly displaced oblique fracture through the proximal shaft of the left fourth metacarpal bone. Electronically Signed: Earnest Parra MD  7/6/2025 1:01 PM EDT  Workstation ID: [id number]                                                     Medical Decision Making  Patient will be started on hydrocodone for pain the patient was started on meclizine as needed for dizziness.  She is encouraged to follow-up with hand doctor.  The patient was stable at discharge and vocalized understanding of discharge instructions and warnings including splint care instruction    Amount and/or Complexity of Data Reviewed  Independent Historian: friend  Labs: ordered. Decision-making details documented in ED Course.  Radiology: ordered and independent interpretation performed.    Risk  OTC drugs.  Prescription drug management.        Final diagnoses:   Dizziness   Displaced fracture of shaft of fourth metacarpal bone,  left hand, initial encounter for closed fracture   Contusion of forehead, initial encounter       ED Disposition  ED Disposition       ED Disposition   Discharge    Condition   Stable    Comment   --               No follow-up provider specified.       Medication List        New Prescriptions      HYDROcodone-acetaminophen 5-325 MG per tablet  Commonly known as: NORCO  Take 1 tablet by mouth Every 6 (Six) Hours As Needed for Moderate Pain or Severe Pain.     meclizine 25 MG tablet  Commonly known as: ANTIVERT  Take 1 tablet by mouth 3 (Three) Times a Day As Needed for Dizziness.               Where to Get Your Medications        These medications were sent to [id number] - [place], [place] - [address] - [phone]  - [phone] FX  [address] [place] [address]      Phone: [phone]   HYDROcodone-acetaminophen 5-325 MG per tablet  meclizine 25 MG tablet            Ti Palacios MD  07/06/25 3279

## 2025-07-06 NOTE — DISCHARGE INSTRUCTIONS
Elevate left hand today keep splint dry and intact  Medication as directed you can also use Tylenol for pain  Follow-up with hand doctor is very important  Rest next 24 hours plenty of fluids

## (undated) DEVICE — ENDOPATH XCEL WITH OPTIVIEW TECHNOLOGY BLADELESS TROCARS WITH STABILITY SLEEVES: Brand: ENDOPATH XCEL OPTIVIEW

## (undated) DEVICE — BLANKT WARM UPPR/BDY ARM/OUT 57X196CM

## (undated) DEVICE — ENDOPATH 5MM CURVED SCISSORS WITH MONOPOLAR CAUTERY: Brand: ENDOPATH

## (undated) DEVICE — ENDOPATH XCEL WITH OPTIVIEW TECHNOLOGY UNIVERSAL TROCAR STABILITY SLEEVES: Brand: ENDOPATH XCEL OPTIVIEW

## (undated) DEVICE — ADHS SKIN PREMIERPRO EXOFIN TOPICAL HI/VISC .5ML

## (undated) DEVICE — GLV SURG BIOGEL LTX PF 7

## (undated) DEVICE — UNDERGLV SURG BIOGEL INDICATOR LTX PF 7

## (undated) DEVICE — PASS SUT PRO BARIATRIC XL W/TROC SWABS

## (undated) DEVICE — GENERAL LAPAROSCOPY CDS: Brand: MEDLINE INDUSTRIES, INC.

## (undated) DEVICE — SOL IRR H2O BTL 1000ML STRL

## (undated) DEVICE — INSUFFLATION TUBING SET, ENDOFLATOR 50: Brand: N.A.

## (undated) DEVICE — CVR HNDL LT SURG ACCSSRY BLU STRL

## (undated) DEVICE — SLV SCD CALF HEMOFORCE DVT THERP REPROC MD

## (undated) DEVICE — BG RETRV TISS SUPERBAG INTRO RIP/STOP NLY 10MM 240ML MD

## (undated) DEVICE — ANTIBACTERIAL UNDYED BRAIDED (POLYGLACTIN 910), SYNTHETIC ABSORBABLE SUTURE: Brand: COATED VICRYL

## (undated) DEVICE — SUT VIC 0 UR6 27IN VCP603H

## (undated) DEVICE — KT SURG TURNOVER 050

## (undated) DEVICE — ENDOPATH XCEL BLADELESS TROCARS WITH STABILITY SLEEVES: Brand: ENDOPATH XCEL